# Patient Record
Sex: MALE | Race: BLACK OR AFRICAN AMERICAN | Employment: UNEMPLOYED | URBAN - METROPOLITAN AREA
[De-identification: names, ages, dates, MRNs, and addresses within clinical notes are randomized per-mention and may not be internally consistent; named-entity substitution may affect disease eponyms.]

---

## 2017-03-22 ENCOUNTER — OFFICE VISIT (OUTPATIENT)
Dept: INTERNAL MEDICINE CLINIC | Age: 57
End: 2017-03-22

## 2017-03-22 VITALS
DIASTOLIC BLOOD PRESSURE: 90 MMHG | RESPIRATION RATE: 16 BRPM | TEMPERATURE: 98.4 F | WEIGHT: 203 LBS | BODY MASS INDEX: 29.06 KG/M2 | SYSTOLIC BLOOD PRESSURE: 150 MMHG | HEIGHT: 70 IN | OXYGEN SATURATION: 97 % | HEART RATE: 74 BPM

## 2017-03-22 DIAGNOSIS — R79.89 ABNORMAL THYROID BLOOD TEST: ICD-10-CM

## 2017-03-22 DIAGNOSIS — M54.2 NECK PAIN: ICD-10-CM

## 2017-03-22 DIAGNOSIS — I10 ESSENTIAL HYPERTENSION, BENIGN: ICD-10-CM

## 2017-03-22 DIAGNOSIS — Z11.59 NEED FOR HEPATITIS C SCREENING TEST: ICD-10-CM

## 2017-03-22 DIAGNOSIS — Z12.5 PROSTATE CANCER SCREENING: ICD-10-CM

## 2017-03-22 DIAGNOSIS — Z00.00 PHYSICAL EXAM: Primary | ICD-10-CM

## 2017-03-22 RX ORDER — AMLODIPINE BESYLATE 10 MG/1
TABLET ORAL
Qty: 90 TAB | Refills: 3 | Status: SHIPPED | OUTPATIENT
Start: 2017-03-22 | End: 2018-03-23 | Stop reason: SDUPTHER

## 2017-03-22 RX ORDER — IBUPROFEN 800 MG/1
800 TABLET ORAL
Qty: 40 TAB | Refills: 1 | Status: SHIPPED | OUTPATIENT
Start: 2017-03-22 | End: 2018-03-25

## 2017-03-22 NOTE — MR AVS SNAPSHOT
Visit Information Date & Time Provider Department Dept. Phone Encounter #  
 3/22/2017  3:00 PM Analilia Gentile, [address] [facility] Internal Medicine [phone] [id number] Follow-up Instructions Return in about 1 month (around 4/22/2017). Upcoming Health Maintenance Date Due Hepatitis C Screening 1960 COLONOSCOPY 4/26/2021 DTaP/Tdap/Td series (2 - Td) 5/14/2022 Allergies as of 3/22/2017  Review Complete On: 3/22/2017 By: Raheem Champion Severity Noted Reaction Type Reactions Lactose  03/22/2017    Diarrhea Lisinopril  12/10/2009    Cough Current Immunizations  Reviewed on 4/27/2016 Name Date Hepatitis A Vaccine 1/23/1996 Hepatitis B Vaccine 5/6/1997, 4/4/1997 IPV 1/23/1996 Influenza Vaccine 11/1/2013  9:00 AM,  Incomplete Meningococcal Vaccine 1/23/1996 PPD 5/14/2012 TDAP Vaccine 5/14/2012 Zoster Vaccine, Live 4/14/2016 Not reviewed this visit You Were Diagnosed With   
  
 Codes Comments Physical exam    -  Primary ICD-10-CM: Z00.00 ICD-9-CM: V70.9 Need for hepatitis C screening test     ICD-10-CM: Z11.59 
ICD-9-CM: V73.89 Prostate cancer screening     ICD-10-CM: Z12.5 ICD-9-CM: V76.44 Abnormal thyroid blood test     ICD-10-CM: R94.6 ICD-9-CM: 794.5 Essential hypertension, benign     ICD-10-CM: I10 
ICD-9-CM: 401.1 Vitals BP Pulse Temp Resp Height(growth percentile) Weight(growth percentile) 150/90 (BP 1 Location: Right arm, BP Patient Position: Sitting) 74 98.4 °F (36.9 °C) (Oral) 16 5' 9.5\" (1.765 m) 203 lb (92.1 kg) SpO2 BMI Smoking Status 97% 29.55 kg/m2 Former Smoker Vitals History BMI and BSA Data Body Mass Index Body Surface Area  
 29.55 kg/m 2 2.13 m 2 Preferred Pharmacy Pharmacy Name Phone [id number] [id number], [address] [phone] Your Updated Medication List  
 This list is accurate as of: 3/22/17  3:48 PM.  Always use your most recent med list. amLODIPine 10 mg tablet Commonly known as:  Ambika Martin TAKE 1 TABLET BY MOUTH EVERY DAY- new dose MOTRIN PO Take  by mouth. As needed TYLENOL PO Take  by mouth. As needed Prescriptions Sent to Pharmacy Refills  
 amLODIPine (NORVASC) 10 mg tablet 3 Sig: TAKE 1 TABLET BY MOUTH EVERY DAY- new dose Class: Normal  
 Pharmacy: [id number] [place] [address], [address] [place] [address] #: [phone] We Performed the Following CBC WITH AUTOMATED DIFF [53453 CPT(R)] HEMOGLOBIN A1C WITH EAG [34997 CPT(R)] HEPATITIS C AB [20287 CPT(R)] LIPID PANEL [01123 CPT(R)] METABOLIC PANEL, COMPREHENSIVE [11302 CPT(R)] PSA SCREENING (SCREENING) [ [facility]] TSH 3RD GENERATION [65660 CPT(R)] URINALYSIS W/ RFLX MICROSCOPIC [08009 CPT(R)] VITAMIN D, 25 HYDROXY [id number] CPT(R)] Follow-up Instructions Return in about 1 month (around 4/22/2017). Introducing [facility]! Dear Juvencio Amayaer: 
Thank you for requesting a [employer] account. Our records indicate that you already have an active Mavent account. You can access your account anytime at [email or web address] [email or web address] Group Did you know that you can access your hospital and ER discharge instructions at any time in Mavent? You can also review all of your test results from your hospital stay or ER visit. Additional Information If you have questions, please visit the Frequently Asked Questions section of the Mavent website at [email or web address] [email or web address] Net 263/422 Group/. Remember, Mavent is NOT to be used for urgent needs. For medical emergencies, dial 911. Now available from your iPhone and Android! Please provide this summary of care documentation to your next provider. Your primary care clinician is listed as MARTIN GALLAGHER. If you have any questions after today's visit, please call [phone].

## 2017-03-22 NOTE — PROGRESS NOTES
HISTORY OF PRESENT ILLNESS  Daylin Kennedy is a 60 y.o. male. PARVEEN Lamb is seen today for a CPE and follow up of other concerns. Preventive medicine. Fully reviewed today. He is due for a complete physical examination and routine screening laboratory studies. He did not do his blood tests last year and is strongly encouraged to have this done this year  He is up to date with the flu vaccine, baseline EKG, other vaccinations. He is also up to date with colonoscopy. Chronic medical problems are reviewed. Hypertension is fair. Reviewed some adjustments in his regimen and will recheck in one month. Review of systems notable for some neck and back pain as well as shoulder pain. Reviewed some stretching exercises and prn Ibuprofen. He will let us know if these symptoms persist.     Social history notable for continuing to work at [employer]. His child is graduating from [employer]. [employer] [place]     We counseled regarding healthy lifestyle issues including diet, exercise and stress management. Family history, social history, etc. Are reviewed and updated, see electronic record. Review of Systems   Constitutional: Negative for weight loss. Respiratory: Negative. Cardiovascular: Negative for chest pain, palpitations, leg swelling and PND. Gastrointestinal: Negative. Genitourinary: Negative. Musculoskeletal: Positive for back pain, joint pain and neck pain. Negative for myalgias. Neurological: Negative for focal weakness. Physical Exam   Constitutional: He is oriented to person, place, and time. He appears well-developed and well-nourished. No distress. HENT:   Head: Normocephalic and atraumatic. Right Ear: Tympanic membrane, external ear and ear canal normal.   Left Ear: Tympanic membrane, external ear and ear canal normal.   Eyes: EOM are normal. Pupils are equal, round, and reactive to light. Right eye exhibits no discharge. Left eye exhibits no discharge. Neck: Normal range of motion. Neck supple. Carotid bruit is not present. No thyromegaly present. Cardiovascular: Normal rate, regular rhythm, normal heart sounds and intact distal pulses. Exam reveals no gallop and no friction rub. No murmur heard. Pulmonary/Chest: Effort normal and breath sounds normal. No respiratory distress. He has no wheezes. He has no rales. Abdominal: Soft. Bowel sounds are normal. He exhibits no distension and no mass. There is no tenderness. There is no rebound and no guarding. Genitourinary: Rectum normal. Rectal exam shows no mass and no tenderness. Prostate is enlarged. Prostate is not tender. Musculoskeletal: Normal range of motion. He exhibits no edema or tenderness. Lymphadenopathy:     He has no cervical adenopathy. Neurological: He is alert and oriented to person, place, and time. He has normal reflexes. Skin: Skin is warm and dry. No rash noted. Psychiatric: He has a normal mood and affect. His behavior is normal.   Nursing note and vitals reviewed. ASSESSMENT and PLAN  Chiquis Madera was seen today for complete physical, neck pain, back pain and shoulder pain. Diagnoses and all orders for this visit:    Physical exam  -     URINALYSIS W/ RFLX MICROSCOPIC  -     VITAMIN D, 25 HYDROXY  -     TSH 3RD GENERATION  -     METABOLIC PANEL, COMPREHENSIVE  -     CBC WITH AUTOMATED DIFF  -     HEMOGLOBIN A1C WITH EAG  -     LIPID PANEL    Need for hepatitis C screening test  -     HEPATITIS C AB    Prostate cancer screening  -     PSA SCREENING (SCREENING)    Abnormal thyroid blood test- Follow off treatment     Essential hypertension, benign  -     amLODIPine (NORVASC) 10 mg tablet; TAKE 1 TABLET BY MOUTH EVERY DAY- new dose    Neck pain  -     ibuprofen (MOTRIN) 800 mg tablet; Take 1 Tab by mouth every eight (8) hours as needed for Pain.

## 2017-03-23 LAB
25(OH)D3+25(OH)D2 SERPL-MCNC: 15.5 NG/ML (ref 30–100)
ALBUMIN SERPL-MCNC: 4.7 G/DL (ref 3.5–5.5)
ALBUMIN/GLOB SERPL: 1.7 {RATIO} (ref 1.2–2.2)
ALP SERPL-CCNC: 99 IU/L (ref 39–117)
ALT SERPL-CCNC: 15 IU/L (ref 0–44)
APPEARANCE UR: ABNORMAL
AST SERPL-CCNC: 13 IU/L (ref 0–40)
BASOPHILS # BLD AUTO: 0 X10E3/UL (ref 0–0.2)
BASOPHILS NFR BLD AUTO: 0 %
BILIRUB SERPL-MCNC: 0.6 MG/DL (ref 0–1.2)
BILIRUB UR QL STRIP: NEGATIVE
BUN SERPL-MCNC: 8 MG/DL (ref 6–24)
BUN/CREAT SERPL: 8 (ref 9–20)
CALCIUM SERPL-MCNC: 9.4 MG/DL (ref 8.7–10.2)
CHLORIDE SERPL-SCNC: 101 MMOL/L (ref 96–106)
CHOLEST SERPL-MCNC: 180 MG/DL (ref 100–199)
CO2 SERPL-SCNC: 25 MMOL/L (ref 18–29)
COLOR UR: YELLOW
CREAT SERPL-MCNC: 0.99 MG/DL (ref 0.76–1.27)
EOSINOPHIL # BLD AUTO: 0.1 X10E3/UL (ref 0–0.4)
EOSINOPHIL NFR BLD AUTO: 2 %
ERYTHROCYTE [DISTWIDTH] IN BLOOD BY AUTOMATED COUNT: 20.1 % (ref 12.3–15.4)
EST. AVERAGE GLUCOSE BLD GHB EST-MCNC: 117 MG/DL
GLOBULIN SER CALC-MCNC: 2.8 G/DL (ref 1.5–4.5)
GLUCOSE SERPL-MCNC: 93 MG/DL (ref 65–99)
GLUCOSE UR QL: NEGATIVE
HBA1C MFR BLD: 5.7 % (ref 4.8–5.6)
HCT VFR BLD AUTO: 35.9 % (ref 37.5–51)
HCV AB S/CO SERPL IA: <0.1 S/CO RATIO (ref 0–0.9)
HDLC SERPL-MCNC: 59 MG/DL
HGB BLD-MCNC: 11 G/DL (ref 12.6–17.7)
HGB UR QL STRIP: NEGATIVE
IMM GRANULOCYTES # BLD: 0 X10E3/UL (ref 0–0.1)
IMM GRANULOCYTES NFR BLD: 1 %
KETONES UR QL STRIP: NEGATIVE
LDLC SERPL CALC-MCNC: 103 MG/DL (ref 0–99)
LEUKOCYTE ESTERASE UR QL STRIP: NEGATIVE
LYMPHOCYTES # BLD AUTO: 2.3 X10E3/UL (ref 0.7–3.1)
LYMPHOCYTES NFR BLD AUTO: 32 %
MCH RBC QN AUTO: 17.7 PG (ref 26.6–33)
MCHC RBC AUTO-ENTMCNC: 30.6 G/DL (ref 31.5–35.7)
MCV RBC AUTO: 58 FL (ref 79–97)
MICRO URNS: ABNORMAL
MONOCYTES # BLD AUTO: 0.5 X10E3/UL (ref 0.1–0.9)
MONOCYTES NFR BLD AUTO: 7 %
NEUTROPHILS # BLD AUTO: 4.1 X10E3/UL (ref 1.4–7)
NEUTROPHILS NFR BLD AUTO: 58 %
NITRITE UR QL STRIP: NEGATIVE
PH UR STRIP: 7.5 [PH] (ref 5–7.5)
PLATELET # BLD AUTO: 249 X10E3/UL (ref 150–379)
POTASSIUM SERPL-SCNC: 4.1 MMOL/L (ref 3.5–5.2)
PROT SERPL-MCNC: 7.5 G/DL (ref 6–8.5)
PROT UR QL STRIP: ABNORMAL
PSA SERPL-MCNC: 3.6 NG/ML (ref 0–4)
RBC # BLD AUTO: 6.21 X10E6/UL (ref 4.14–5.8)
SODIUM SERPL-SCNC: 143 MMOL/L (ref 134–144)
SP GR UR: 1.02 (ref 1–1.03)
TRIGL SERPL-MCNC: 91 MG/DL (ref 0–149)
TSH SERPL DL<=0.005 MIU/L-ACNC: 0.78 UIU/ML (ref 0.45–4.5)
UROBILINOGEN UR STRIP-MCNC: 0.2 MG/DL (ref 0.2–1)
VLDLC SERPL CALC-MCNC: 18 MG/DL (ref 5–40)
WBC # BLD AUTO: 7 X10E3/UL (ref 3.4–10.8)

## 2017-03-29 DIAGNOSIS — R97.20 INCREASED PROSTATE SPECIFIC ANTIGEN (PSA) VELOCITY: Primary | ICD-10-CM

## 2017-03-29 DIAGNOSIS — R73.01 IFG (IMPAIRED FASTING GLUCOSE): ICD-10-CM

## 2017-03-29 RX ORDER — ERGOCALCIFEROL 1.25 MG/1
50000 CAPSULE ORAL
Qty: 12 CAP | Refills: 3 | Status: SHIPPED | OUTPATIENT
Start: 2017-03-29 | End: 2018-01-25 | Stop reason: SDUPTHER

## 2017-03-29 NOTE — PROGRESS NOTES
Advised pt MD has reviewed labs - 3 things with labs - otherwise labs look great overall.   - psa for prostate cancer screening has risen a bit more than expected, MD recommends to repeat - lab only in 3 months. Dx increased psa velocity. - there is slight elevation of average blood sugar based on the A1c measurement. This can be controlled / improved by staying active and watching the diet for concentrated sweets and excessive starchy carbohydrates.  Kim a1c in 3 months also - lab only. Will mail lab slip to pt as he requested.   -vitamin d is very low - start 55536 units q week. Stay on this as a maintenance medication. Sent to pharmacy.

## 2017-03-29 NOTE — PROGRESS NOTES
Call- 3 things with labs, otherwise Labs look great overall   - psa for prostate cancer screening has risen a bit more than expected, repeat, lab only in 3 mos. Dx increased psa velocity    - There is slight elevation of average blood sugar based on the A1c measurement. This can be controlled / improved by staying active and watching the diet for concentrated sweets and excessive starchy carbohydrates. Kim a1c in 3 mos, lab only    Vitamin d is very low- start 31767 units q week .  Stay on this as a maintenance medication

## 2017-04-19 ENCOUNTER — OFFICE VISIT (OUTPATIENT)
Dept: INTERNAL MEDICINE CLINIC | Age: 57
End: 2017-04-19

## 2017-04-19 VITALS
SYSTOLIC BLOOD PRESSURE: 130 MMHG | DIASTOLIC BLOOD PRESSURE: 80 MMHG | BODY MASS INDEX: 28.63 KG/M2 | RESPIRATION RATE: 16 BRPM | HEART RATE: 76 BPM | TEMPERATURE: 98.6 F | HEIGHT: 70 IN | WEIGHT: 200 LBS | OXYGEN SATURATION: 96 %

## 2017-04-19 DIAGNOSIS — R73.01 IFG (IMPAIRED FASTING GLUCOSE): ICD-10-CM

## 2017-04-19 DIAGNOSIS — I10 ESSENTIAL HYPERTENSION, BENIGN: Primary | ICD-10-CM

## 2017-04-19 DIAGNOSIS — E55.9 VITAMIN D DEFICIENCY: ICD-10-CM

## 2017-04-19 NOTE — MR AVS SNAPSHOT
Visit Information Date & Time Provider Department Dept. Phone Encounter #  
 4/19/2017  1:40 PM Ion Lance, [address] [facility] Internal Medicine [phone] Follow-up Instructions Return in about 1 year (around 4/19/2018) for cpe. Upcoming Health Maintenance Date Due COLONOSCOPY 4/26/2021 DTaP/Tdap/Td series (2 - Td) 5/14/2022 Allergies as of 4/19/2017  Review Complete On: 4/19/2017 By: Ion Lance MD  
  
 Severity Noted Reaction Type Reactions Lactose  03/22/2017    Diarrhea Lisinopril  12/10/2009    Cough Current Immunizations  Reviewed on 4/27/2016 Name Date Hepatitis A Vaccine 1/23/1996 Hepatitis B Vaccine 5/6/1997, 4/4/1997 IPV 1/23/1996 Influenza Vaccine 11/1/2013  9:00 AM,  Incomplete Meningococcal Vaccine 1/23/1996 PPD 5/14/2012 TDAP Vaccine 5/14/2012 Zoster Vaccine, Live 4/14/2016 Not reviewed this visit You Were Diagnosed With   
  
 Codes Comments Essential hypertension, benign    -  Primary ICD-10-CM: I10 
ICD-9-CM: 401.1 IFG (impaired fasting glucose)     ICD-10-CM: R73.01 
ICD-9-CM: 790.21 Vitamin D deficiency     ICD-10-CM: E55.9 ICD-9-CM: 268.9 Vitals BP Pulse Temp Resp Height(growth percentile) Weight(growth percentile) 130/80 (BP 1 Location: Right arm, BP Patient Position: Sitting) 76 98.6 °F (37 °C) (Oral) 16 5' 9.5\" (1.765 m) 200 lb (90.7 kg) SpO2 BMI Smoking Status 96% 29.11 kg/m2 Former Smoker BMI and BSA Data Body Mass Index Body Surface Area  
 29.11 kg/m 2 2.11 m 2 Preferred Pharmacy [facility]acy Name Phone [id number], [address] [facility] [phone] Your Updated Medication List  
  
   
This list is accurate as of: 4/19/17  2:15 PM.  Always use your most recent med list. amLODIPine 10 mg tablet Commonly known as:  Anthony Brand TAKE 1 TABLET BY MOUTH EVERY DAY- new dose  
  
 ergocalciferol 50,000 unit capsule Commonly known as:  ERGOCALCIFEROL Take 1 Cap by mouth every seven (7) days. ibuprofen 800 mg tablet Commonly known as:  MOTRIN Take 1 Tab by mouth every eight (8) hours as needed for Pain. TYLENOL PO Take  by mouth. As needed Follow-up Instructions Return in about 1 year (around 4/19/2018) for cpe. Introducing [facility]! Dear Cedric Duenas: 
Thank you for requesting a [employer] account. Our records indicate that you already have an active [employer] account. You can access your account anytime at [email or web address]. nDreams/"[email or web address] Did you know that you can access your hospital and ER discharge instructions at any time in [employer]? You can also review all of your test results from your hospital stay or ER visit. Additional Information If you have questions, please visit the Frequently Asked Questions section of the [employer] website at [email or web address] too/"[email or web address]/. Remember, [employer] is NOT to be used for urgent needs. For medical emergencies, dial 911. Now available from your iPhone and Android! Please provide this summary of care documentation to your next provider. Your primary care clinician is listed as MARTIN GALLAGHER. If you have any questions after today's visit, please call [phone].

## 2017-04-19 NOTE — PROGRESS NOTES
HISTORY OF PRESENT ILLNESS  Margy Kennedy is a 60 y.o. male. HPI Margy Gonzalez is seen today for follow up. 1. Essential hypertension. Blood pressure is improved with adjusted medication. He denies medication side effects. 2. IFG. He has changed his diet, lost some weight. He is watching carbohydrates and generally trying to eat a healthy diet. He will have labs done in two months. Office visit not required. MedDATA/gwo         Review of Systems   Constitutional: Positive for weight loss. Respiratory: Negative. Cardiovascular: Negative for chest pain, palpitations, leg swelling and PND. Musculoskeletal: Negative for myalgias. Neurological: Negative for focal weakness. Physical Exam   Constitutional: No distress. Cardiovascular: Normal rate and regular rhythm. Exam reveals no gallop and no friction rub. No murmur heard. Pulmonary/Chest: Effort normal and breath sounds normal.   Nursing note and vitals reviewed. ASSESSMENT and PLAN  Margy Gonzalez was seen today for medication evaluation.     Diagnoses and all orders for this visit:    Essential hypertension, benign- Continue current regimen of prescription and / or OTC medications     IFG (impaired fasting glucose)- Diet and exercise     Vitamin D deficiency- Continue current regimen of prescription and / or OTC medications

## 2017-10-18 LAB
EST. AVERAGE GLUCOSE BLD GHB EST-MCNC: 105 MG/DL
HBA1C MFR BLD: 5.3 % (ref 4.8–5.6)
PSA SERPL-MCNC: 4.2 NG/ML (ref 0–4)

## 2017-10-29 ENCOUNTER — TELEPHONE (OUTPATIENT)
Dept: INTERNAL MEDICINE CLINIC | Age: 57
End: 2017-10-29

## 2017-10-29 NOTE — TELEPHONE ENCOUNTER
Reviewed lab -  - a1c improved, follow  - psa increased further, See urologist as directed , names provided and he'll  schedule

## 2018-01-25 RX ORDER — ERGOCALCIFEROL 1.25 MG/1
CAPSULE ORAL
Qty: 12 CAP | Refills: 3 | Status: SHIPPED | OUTPATIENT
Start: 2018-01-25 | End: 2019-01-19 | Stop reason: SDUPTHER

## 2018-03-09 ENCOUNTER — TELEPHONE (OUTPATIENT)
Dept: INTERNAL MEDICINE CLINIC | Age: 58
End: 2018-03-09

## 2018-03-09 NOTE — TELEPHONE ENCOUNTER
Spoke with pt - he states MD referred him to urologist last year at his visit - wants to know name. Advised in chart he has referral back in 2014 to Dr Hans Morales - given number. Also wanted name for GI - referral given back in 2016 to Dr Reena Anna - number given.

## 2018-03-23 DIAGNOSIS — I10 ESSENTIAL HYPERTENSION, BENIGN: ICD-10-CM

## 2018-03-23 RX ORDER — AMLODIPINE BESYLATE 10 MG/1
TABLET ORAL
Qty: 90 TAB | Refills: 3 | Status: SHIPPED | OUTPATIENT
Start: 2018-03-23 | End: 2019-03-16 | Stop reason: SDUPTHER

## 2018-03-25 ENCOUNTER — APPOINTMENT (OUTPATIENT)
Dept: GENERAL RADIOLOGY | Age: 58
End: 2018-03-25
Attending: PHYSICIAN ASSISTANT
Payer: COMMERCIAL

## 2018-03-25 ENCOUNTER — HOSPITAL ENCOUNTER (EMERGENCY)
Age: 58
Discharge: HOME | End: 2018-03-25
Attending: EMERGENCY MEDICINE
Payer: COMMERCIAL

## 2018-03-25 VITALS
SYSTOLIC BLOOD PRESSURE: 146 MMHG | HEIGHT: 70 IN | DIASTOLIC BLOOD PRESSURE: 89 MMHG | HEART RATE: 107 BPM | WEIGHT: 202 LBS | RESPIRATION RATE: 18 BRPM | OXYGEN SATURATION: 99 % | BODY MASS INDEX: 28.92 KG/M2 | TEMPERATURE: 98.1 F

## 2018-03-25 DIAGNOSIS — S83.91XA SPRAIN OF RIGHT KNEE, UNSPECIFIED LIGAMENT, INITIAL ENCOUNTER: Primary | ICD-10-CM

## 2018-03-25 PROCEDURE — 99283 EMERGENCY DEPT VISIT LOW MDM: CPT

## 2018-03-25 PROCEDURE — 73562 X-RAY EXAM OF KNEE 3: CPT

## 2018-03-25 PROCEDURE — L1830 KO IMMOB CANVAS LONG PRE OTS: HCPCS

## 2018-03-25 RX ORDER — NAPROXEN 375 MG/1
375 TABLET ORAL 2 TIMES DAILY WITH MEALS
Qty: 20 TAB | Refills: 0 | Status: SHIPPED | OUTPATIENT
Start: 2018-03-25 | End: 2018-04-19 | Stop reason: ALTCHOICE

## 2018-03-25 NOTE — ED TRIAGE NOTES
TRIAGE NOTE: Patient reports right knee pain for a couple of days after squatting and hearing a pop. +swelling

## 2018-03-25 NOTE — DISCHARGE INSTRUCTIONS
Knee Sprain: Care Instructions  Your Care Instructions    A knee sprain is one or more stretched, partly torn, or completely torn knee ligaments. Ligaments are bands of ropelike tissue that connect bone to bone and make the knee stable. The knee has four main ligaments. Knee sprains often happen because of a twisting or bending injury from sports such as skiing, basketball, soccer, or football. The knee turns one way while the lower or upper leg goes another way. A sprain also can happen when the knee is hit from the side or the front. If a knee ligament is slightly stretched, you will probably need only home treatment. You may need a splint or brace (immobilizer) for a partly torn ligament. A complete tear may need surgery. A minor knee sprain may take up to 6 weeks to heal, while a severe sprain may take months. Follow-up care is a key part of your treatment and safety. Be sure to make and go to all appointments, and call your doctor if you are having problems. It's also a good idea to know your test results and keep a list of the medicines you take. How can you care for yourself at home? · Follow instructions about how much weight you can put on your leg and how to walk with crutches. · Prop up your leg on a pillow when you ice it or anytime you sit or lie down for the next 3 days. Try to keep it above the level of your heart. This will help reduce swelling. · Put ice or a cold pack on your knee for 10 to 20 minutes at a time. Try to do this every 1 to 2 hours for the next 3 days (when you are awake) or until the swelling goes down. Put a thin cloth between the ice and your skin. Do not get the splint wet. · If you have an elastic bandage, make sure it is snug but not so tight that your leg is numb, tingles, or swells below the bandage. You can loosen the bandage if it is too tight. · Your doctor may recommend a brace (immobilizer) to support your knee while it heals. Wear it as directed.   · Ask your doctor if you can take an over-the-counter pain medicine, such as acetaminophen (Tylenol), ibuprofen (Advil, Motrin), or naproxen (Aleve). Be safe with medicines. Read and follow all instructions on the label. When should you call for help? Call 911 anytime you think you may need emergency care. For example, call if:  ? · You have sudden chest pain and shortness of breath, or you cough up blood. ?Call your doctor now or seek immediate medical care if:  ? · You have increased or severe pain. ? · You cannot move your toes or ankle. ? · Your foot is cool or pale or changes color. ? · You have tingling, weakness, or numbness in your foot or leg. ? · Your splint or brace feels too tight. ? · You are unable to straighten the knee, or the knee \"locks. \"   ? · You have signs of a blood clot in your leg, such as:  ¨ Pain in your calf, back of the knee, thigh, or groin. ¨ Redness and swelling in your leg. ? Watch closely for changes in your health, and be sure to contact your doctor if:  ? · Your pain is not getting better or is getting worse. Where can you learn more? Go to http://megan-mitzy.info/. Enter N406 in the search box to learn more about \"Knee Sprain: Care Instructions. \"  Current as of: March 21, 2017  Content Version: 11.4  © 9321-7954 [employer]. Care instructions adapted under license by [employer] (which disclaims liability or warranty for this information). If you have questions about a medical condition or this instruction, always ask your healthcare professional. [place] [address] any warranty or liability for your use of this information. Learning About RICE (Rest, Ice, Compression, and Elevation)  What is RICE? RICE is a way to care for an injury. RICE helps relieve pain and swelling. It may also help with healing and flexibility. RICE stands for:  · Rest and protect the injured or sore area.   · Ice or a cold pack used as soon as possible. · Compression, or wrapping the injured or sore area with an elastic bandage. · Elevation (propping up) the injured or sore area. How do you do RICE? You can use RICE for home treatment when you have general aches and pains or after an injury or surgery. Rest  · Do not put weight on the injury for at least 24 to 48 hours. · Use crutches for a badly sprained knee or ankle. · Support a sprained wrist, elbow, or shoulder with a sling. Ice  · Put ice or a cold pack on the injury right away to reduce pain and swelling. Frozen vegetables will also work as an ice pack. Put a thin cloth between the ice or cold pack and your skin. The cloth protects the injured area from getting too cold. · Use ice for 10 to 15 minutes at a time for the first 48 to 72 hours. Compression  · Use compression for sprains, strains, and surgeries of the arms and legs. · Wrap the injured area with an elastic bandage or compression sleeve to reduce swelling. · Don't wrap it too tightly. If the area below it feels numb, tingles, or feels cool, loosen the wrap. Elevation  · Use elevation for areas of the body that can be propped up, such as arms and legs. · Prop up the injured area on pillows whenever you use ice. Keep it propped up anytime you sit or lie down. · Try to keep the injured area at or above the level of your heart. This will help reduce swelling and bruising. Where can you learn more? Go to http://megan-mitzy.info/. Enter I222 in the search box to learn more about \"Learning About RICE (Rest, Ice, Compression, and Elevation). \"  Current as of: March 21, 2017  Content Version: 11.4  © 4156-1938 [employer]. Care instructions adapted under license by [employer] (which disclaims liability or warranty for this information).  If you have questions about a medical condition or this instruction, always ask your healthcare professional. [employer] disclaims any warranty or liability for your use of this information.

## 2018-03-25 NOTE — ED PROVIDER NOTES
HPI Comments: Kat Kennedy is a 59 y.o. male who presents ambulatory to ER with c/o right anterior knee pain and swelling x 5 days. Pt states he squatted down to get something out of the refrigerator and felt a pop in his right knee 5 days ago. Notes pain to R knee and swelling since that time. Notes pain worse with walking and weight bearing. Has been taking tylenol and ibuprofen without any relief. PCP: Shravan Lorenzo MD   PMHx significant for: Past Medical History:  No date: Anemia NEC      Comment: chronic  No date: Hypertension  No date: Thalassemia trait    PSHx significant for: Past Surgical History:  No date: ENDOSCOPY, COLON, DIAGNOSTIC      Comment: 09,due 10  No date: HX HEENT      Comment: cyst on forehead        -- Lactose -- Diarrhea   -- Lisinopril -- Cough    There are no other complaints, changes or physical findings at this time. The history is provided by the patient. Past Medical History:   Diagnosis Date    Anemia NEC     chronic    Hypertension     Thalassemia trait        Past Surgical History:   Procedure Laterality Date    ENDOSCOPY, COLON, DIAGNOSTIC      09,due 12    HX HEENT      cyst on forehead         Family History:   Problem Relation Age of Onset    Cancer Mother      colon    Cancer Father      prostate    Cancer Brother      prostate       Social History     Social History    Marital status:      Spouse name: N/A    Number of children: N/A    Years of education: N/A     Occupational History    Not on file.      Social History Main Topics    Smoking status: Former Smoker     Packs/day: 1.00     Types: Cigarettes     Quit date: 1/18/2015    Smokeless tobacco: Never Used    Alcohol use Yes      Comment: 1 drink per week    Drug use: No    Sexual activity: Yes     Partners: Female     Birth control/ protection: None     Other Topics Concern    Not on file     Social History Narrative         ALLERGIES: Lactose and Lisinopril    Review of Systems   Constitutional: Negative. HENT: Negative. Eyes: Negative. Respiratory: Negative. Cardiovascular: Negative. Gastrointestinal: Negative. Genitourinary: Negative. Musculoskeletal: Positive for arthralgias (R knee pain). Skin: Negative. Neurological: Negative. Hematological: Negative. Psychiatric/Behavioral: Negative. All other systems reviewed and are negative. Vitals:    03/25/18 1115   BP: 146/89   Pulse: (!) 107   Resp: 18   Temp: 98.1 °F (36.7 °C)   SpO2: 99%   Weight: 91.6 kg (202 lb)   Height: 5' 9.5\" (1.765 m)            Physical Exam   Constitutional: He is oriented to person, place, and time. He appears well-developed and well-nourished. No distress. HENT:   Head: Normocephalic and atraumatic. Neck: Normal range of motion. Cardiovascular: Intact distal pulses and normal pulses. Musculoskeletal: Normal range of motion. He exhibits no edema or tenderness. Right anterior knee joint TTP along lateral aspect with mild swelling. Pain elicited with movement of joint. NVI distally,DP pulse 2+. Ambulatory with limp   Neurological: He is alert and oriented to person, place, and time. He has normal strength. No sensory deficit. Skin: Skin is warm and dry. No rash noted. He is not diaphoretic. No erythema. No pallor. Psychiatric: He has a normal mood and affect. His behavior is normal.   Nursing note and vitals reviewed. MDM  Number of Diagnoses or Management Options  Sprain of right knee, unspecified ligament, initial encounter:   Diagnosis management comments: DDx: sprain, strain, fx, dislocation       Amount and/or Complexity of Data Reviewed  Tests in the radiology section of CPT®: ordered and reviewed  Discuss the patient with other providers: yes    Patient Progress  Patient progress: stable        ED Course       Procedures                       12:24 PM  Pt has been reevaluated.  There are no new complaints, changes, or physical findings at this time. Medications have been reviewed w/ pt and/or family. Pt and/or family's questions have been answered. Pt and/or family expressed good understanding of the dx/tx/rx and is in agreement with plan of care. Pt instructed and agreed to f/u w/ PCP, ortho and to return to ED upon further deterioration. Pt is ready for discharge. LABORATORY TESTS:  No results found for this or any previous visit (from the past 12 hour(s)). IMAGING RESULTS:  XR KNEE RT 3 V   Final Result        Xr Knee Rt 3 V    Result Date: 3/25/2018  EXAM:  XR KNEE RT 3 V INDICATION:   R knee pain/swelling. COMPARISON: None. FINDINGS: Three views of the right knee demonstrate no fracture or other acute osseous or articular abnormality. There is mild tricompartmental osteoarthritis. There is a small knee joint effusion. IMPRESSION: No acute fracture or dislocation. Osteoarthritis with small knee joint effusion. MEDICATIONS GIVEN:  Medications - No data to display    IMPRESSION:  1. Sprain of right knee, unspecified ligament, initial encounter        PLAN:  1. Discharge Medication List as of 3/25/2018 12:24 PM      START taking these medications    Details   naproxen (NAPROSYN) 375 mg tablet Take 1 Tab by mouth two (2) times daily (with meals). , Print, Disp-20 Tab, R-0         CONTINUE these medications which have NOT CHANGED    Details   amLODIPine (NORVASC) 10 mg tablet TAKE 1 TABLET BY MOUTH EVERY DAY, Normal, Disp-90 Tab, R-3      ergocalciferol (ERGOCALCIFEROL) 50,000 unit capsule TAKE 1 CAPSULE BY MOUTH EVERY 7 DAYS, Normal, Disp-12 Cap, R-3      ACETAMINOPHEN (TYLENOL PO) Take  by mouth.  As needed, Historical Med         STOP taking these medications       ibuprofen (MOTRIN) 800 mg tablet Comments:   Reason for Stoppin.   Follow-up Information     Follow up With Details Comments 1 [facility] Demarcus Vergara MD Schedule an appointment as soon as possible for a visit in 3 days  [address] [place] [address] [facility] [address] [facility]      Gloria Hammer,  Schedule an appointment as soon as possible for a visit in 3 days As needed; ORTHOPEDICS 139[address] [facility] [address] [facility] [address], [facility] DEP  If symptoms worsen [address] [facility]  [phone]            Return to ED if worse

## 2018-04-19 ENCOUNTER — OFFICE VISIT (OUTPATIENT)
Dept: INTERNAL MEDICINE CLINIC | Age: 58
End: 2018-04-19

## 2018-04-19 VITALS
WEIGHT: 207.8 LBS | OXYGEN SATURATION: 95 % | RESPIRATION RATE: 18 BRPM | SYSTOLIC BLOOD PRESSURE: 116 MMHG | BODY MASS INDEX: 30.78 KG/M2 | TEMPERATURE: 98.1 F | DIASTOLIC BLOOD PRESSURE: 71 MMHG | HEIGHT: 69 IN | HEART RATE: 74 BPM

## 2018-04-19 DIAGNOSIS — Z12.5 PROSTATE CANCER SCREENING: ICD-10-CM

## 2018-04-19 DIAGNOSIS — Z00.00 PHYSICAL EXAM: Primary | ICD-10-CM

## 2018-04-19 DIAGNOSIS — Z12.11 SCREEN FOR COLON CANCER: ICD-10-CM

## 2018-04-19 DIAGNOSIS — E55.9 VITAMIN D DEFICIENCY: ICD-10-CM

## 2018-04-19 NOTE — MR AVS SNAPSHOT
[address] [place] [address] 57 
[phone] Patient: Zenon Kennedy MRN: [id number] SCT:[birth year] Visit Information Date & Time Provider Department Dept. Phone Encounter #  
 4/19/2018 10:00 AM Twan Lr, [facility] Internal Medicine [phone] [id number] Follow-up Instructions Return in about 1 year (around 4/19/2019). Upcoming Health Maintenance Date Due Influenza Age 5 to Adult 8/1/2018* COLONOSCOPY 4/26/2021 DTaP/Tdap/Td series (2 - Td) 5/14/2022 *Topic was postponed. The date shown is not the original due date. Allergies as of 4/19/2018  Review Complete On: 4/19/2018 By: Twan Lr MD  
  
 Severity Noted Reaction Type Reactions Lactose  03/22/2017    Diarrhea Lisinopril  12/10/2009    Cough Current Immunizations  Reviewed on 4/27/2016 Name Date Hepatitis A Vaccine 1/23/1996 Hepatitis B Vaccine 5/6/1997, 4/4/1997 IPV 1/23/1996 Influenza Vaccine 11/1/2013  9:00 AM,  Incomplete Meningococcal Vaccine 1/23/1996 PPD 5/14/2012 TDAP Vaccine 5/14/2012 Zoster Vaccine, Live 4/14/2016 Not reviewed this visit You Were Diagnosed With   
  
 Codes Comments Physical exam    -  Primary ICD-10-CM: Z00.00 ICD-9-CM: V70.9 Prostate cancer screening     ICD-10-CM: Z12.5 ICD-9-CM: V76.44 Vitamin D deficiency     ICD-10-CM: E55.9 ICD-9-CM: 268.9 Screen for colon cancer     ICD-10-CM: Z12.11 ICD-9-CM: V76.51 Vitals BP Pulse Temp Resp Height(growth percentile) Weight(growth percentile) 116/71 (BP 1 Location: Left arm, BP Patient Position: Sitting) 74 98.1 °F (36.7 °C) (Oral) 18 5' 9\" (1.753 m) 207 lb 12.8 oz (94.3 kg) SpO2 BMI Smoking Status 95% 30.69 kg/m2 Former Smoker BMI and BSA Data Body Mass Index Body Surface Area  
 30.69 kg/m 2 2.14 m 2 Preferred Pharmacy Pharmacy Name Phone [address] [place] [address], [facility] Esmer Boss [phone] Your Updated Medication List  
  
   
This list is accurate as of 4/19/18 10:47 AM.  Always use your most recent med list. amLODIPine 10 mg tablet Commonly known as:  Daljit Rings TAKE 1 TABLET BY MOUTH EVERY DAY  
  
 ergocalciferol 50,000 unit capsule Commonly known as:  ERGOCALCIFEROL  
TAKE 1 CAPSULE BY MOUTH EVERY 7 DAYS  
  
 TYLENOL PO Take  by mouth. As needed We Performed the Following CBC WITH AUTOMATED DIFF [51559 CPT(R)] LIPID PANEL [54785 CPT(R)] METABOLIC PANEL, COMPREHENSIVE [19821 CPT(R)] PSA SCREENING (SCREENING) [ [facility]] REFERRAL TO GASTROENTEROLOGY [PZS79 Custom] TSH 3RD GENERATION [25314 CPT(R)] URINALYSIS W/ RFLX MICROSCOPIC [01313 CPT(R)] VITAMIN D, 25 HYDROXY Q1238162 CPT(R)] Follow-up Instructions Return in about 1 year (around 4/19/2019). Referral Information Referral ID Referred By Referred To  
  
 [id number] Yony GALLAGHER MD   
   [address] [place] [address] Phone: [phone] Fax: [phone] Visits Status Start Date End Date 1 New Request 4/19/18 4/19/19 If your referral has a status of pending review or denied, additional information will be sent to support the outcome of this decision. Introducing [facility]! Dear Saul Garzon: 
Thank you for requesting a ugichem account. Our records indicate that you already have an active ugichem account. You can access your account anytime at [email or web address] Did you know that you can access your hospital and ER discharge instructions at any time in ugichem? You can also review all of your test results from your hospital stay or ER visit. Additional Information If you have questions, please visit the Frequently Asked Questions section of the [employer] website at [email or web address] Remember, Jingdong is NOT to be used for urgent needs. For medical emergencies, dial 911. Now available from your iPhone and Android! Please provide this summary of care documentation to your next provider. Your primary care clinician is listed as MARTIN GALLAGHER. If you have any questions after today's visit, please call [phone].

## 2018-04-19 NOTE — PROGRESS NOTES
HISTORY OF PRESENT ILLNESS  Pilar Kennedy is a 59 y.o. male. [facility] [place] Pilar Mix is seen today for a complete physical examination and follow up of chronic problems. 1.  Preventive medicine. Fully reviewed today. He is due for a complete physical examination and routine screening laboratory studies. Also, due for a colonoscopy. He is up to date with vaccinations and EKG. He would like to defer the new shingles vaccine. 2. Chronic medical problems are reviewed. -Blood pressure is fine. A1c due for blood sugar assessment. Review of  Systems:  Notable for right knee pain for the past month. He squatted to pick something up and felt a pop. He had several evaluations, but eventually was diagnosed with a meniscus tear which is being treated conservatively for now. Social History: Notable for him getting laid off from his job. 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 Review of Systems   Constitutional: Negative for weight loss. Respiratory: Negative. Cardiovascular: Negative for chest pain, palpitations, leg swelling and PND. Gastrointestinal: Negative. Genitourinary: Negative. Musculoskeletal: Positive for back pain and joint pain. Negative for myalgias. Back pain is mild   Neurological: Positive for dizziness. Negative for focal weakness. C/w bpv, episodic       Physical Exam   Constitutional: He is oriented to person, place, and time. He appears well-developed and well-nourished. No distress. HENT:   Head: Normocephalic and atraumatic. Right Ear: Tympanic membrane, external ear and ear canal normal.   Left Ear: Tympanic membrane, external ear and ear canal normal.   Eyes: EOM are normal. Pupils are equal, round, and reactive to light. Right eye exhibits no discharge. Left eye exhibits no discharge. Neck: Normal range of motion. Neck supple. Carotid bruit is not present. No thyromegaly present.    Cardiovascular: Normal rate, regular rhythm, normal heart sounds and intact distal pulses. Exam reveals no gallop and no friction rub. No murmur heard. Pulmonary/Chest: Effort normal and breath sounds normal. No respiratory distress. He has no wheezes. He has no rales. Abdominal: Soft. Bowel sounds are normal. He exhibits no distension and no mass. There is no tenderness. There is no rebound and no guarding. Genitourinary: Rectum normal. Rectal exam shows no mass and no tenderness. Prostate is enlarged. Prostate is not tender. Musculoskeletal: Normal range of motion. He exhibits no edema or tenderness. Lymphadenopathy:     He has no cervical adenopathy. Neurological: He is alert and oriented to person, place, and time. Reflex Scores:       Patellar reflexes are 1+ on the right side and 1+ on the left side. Skin: Skin is warm and dry. No rash noted. Psychiatric: He has a normal mood and affect. His behavior is normal.   Nursing note and vitals reviewed. ASSESSMENT and PLAN  Diagnoses and all orders for this visit:    1. Physical exam  -     LIPID PANEL  -     METABOLIC PANEL, COMPREHENSIVE  -     CBC WITH AUTOMATED DIFF  -     URINALYSIS W/ RFLX MICROSCOPIC  -     TSH 3RD GENERATION    2.  Prostate cancer screening  -     PSA SCREENING (SCREENING)    3. Vitamin D deficiency  -     VITAMIN D, 25 HYDROXY    4. Screen for colon cancer  -     REFERRAL TO GASTROENTEROLOGY

## 2018-05-24 LAB
25(OH)D3+25(OH)D2 SERPL-MCNC: 48.6 NG/ML (ref 30–100)
ALBUMIN SERPL-MCNC: 4.6 G/DL (ref 3.5–5.5)
ALBUMIN/GLOB SERPL: 1.8 {RATIO} (ref 1.2–2.2)
ALP SERPL-CCNC: 92 IU/L (ref 39–117)
ALT SERPL-CCNC: 12 IU/L (ref 0–44)
APPEARANCE UR: CLEAR
AST SERPL-CCNC: 15 IU/L (ref 0–40)
BASOPHILS # BLD AUTO: 0 X10E3/UL (ref 0–0.2)
BASOPHILS NFR BLD AUTO: 1 %
BILIRUB SERPL-MCNC: 0.2 MG/DL (ref 0–1.2)
BILIRUB UR QL STRIP: NEGATIVE
BUN SERPL-MCNC: 13 MG/DL (ref 6–24)
BUN/CREAT SERPL: 14 (ref 9–20)
CALCIUM SERPL-MCNC: 9.1 MG/DL (ref 8.7–10.2)
CHLORIDE SERPL-SCNC: 103 MMOL/L (ref 96–106)
CHOLEST SERPL-MCNC: 152 MG/DL (ref 100–199)
CO2 SERPL-SCNC: 28 MMOL/L (ref 18–29)
COLOR UR: YELLOW
CREAT SERPL-MCNC: 0.96 MG/DL (ref 0.76–1.27)
EOSINOPHIL # BLD AUTO: 0.3 X10E3/UL (ref 0–0.4)
EOSINOPHIL NFR BLD AUTO: 4 %
ERYTHROCYTE [DISTWIDTH] IN BLOOD BY AUTOMATED COUNT: 20.2 % (ref 12.3–15.4)
GFR SERPLBLD CREATININE-BSD FMLA CKD-EPI: 100 ML/MIN/1.73
GFR SERPLBLD CREATININE-BSD FMLA CKD-EPI: 86 ML/MIN/1.73
GLOBULIN SER CALC-MCNC: 2.6 G/DL (ref 1.5–4.5)
GLUCOSE SERPL-MCNC: 99 MG/DL (ref 65–99)
GLUCOSE UR QL: NEGATIVE
HCT VFR BLD AUTO: 33.9 % (ref 37.5–51)
HDLC SERPL-MCNC: 40 MG/DL
HGB BLD-MCNC: 10 G/DL (ref 13–17.7)
HGB UR QL STRIP: NEGATIVE
IMM GRANULOCYTES # BLD: 0.1 X10E3/UL (ref 0–0.1)
IMM GRANULOCYTES NFR BLD: 1 %
KETONES UR QL STRIP: NEGATIVE
LDLC SERPL CALC-MCNC: 86 MG/DL (ref 0–99)
LEUKOCYTE ESTERASE UR QL STRIP: NEGATIVE
LYMPHOCYTES # BLD AUTO: 2.4 X10E3/UL (ref 0.7–3.1)
LYMPHOCYTES NFR BLD AUTO: 28 %
MCH RBC QN AUTO: 17.6 PG (ref 26.6–33)
MCHC RBC AUTO-ENTMCNC: 29.5 G/DL (ref 31.5–35.7)
MCV RBC AUTO: 60 FL (ref 79–97)
MICRO URNS: NORMAL
MONOCYTES # BLD AUTO: 0.6 X10E3/UL (ref 0.1–0.9)
MONOCYTES NFR BLD AUTO: 7 %
NEUTROPHILS # BLD AUTO: 5.3 X10E3/UL (ref 1.4–7)
NEUTROPHILS NFR BLD AUTO: 59 %
NITRITE UR QL STRIP: NEGATIVE
PH UR STRIP: 5.5 [PH] (ref 5–7.5)
PLATELET # BLD AUTO: 266 X10E3/UL (ref 150–379)
POTASSIUM SERPL-SCNC: 4.3 MMOL/L (ref 3.5–5.2)
PROT SERPL-MCNC: 7.2 G/DL (ref 6–8.5)
PROT UR QL STRIP: NEGATIVE
PSA SERPL-MCNC: 5.4 NG/ML (ref 0–4)
RBC # BLD AUTO: 5.67 X10E6/UL (ref 4.14–5.8)
SODIUM SERPL-SCNC: 141 MMOL/L (ref 134–144)
SP GR UR: 1.03 (ref 1–1.03)
TRIGL SERPL-MCNC: 130 MG/DL (ref 0–149)
TSH SERPL DL<=0.005 MIU/L-ACNC: 1.04 UIU/ML (ref 0.45–4.5)
UROBILINOGEN UR STRIP-MCNC: 1 MG/DL (ref 0.2–1)
VLDLC SERPL CALC-MCNC: 26 MG/DL (ref 5–40)
WBC # BLD AUTO: 8.7 X10E3/UL (ref 3.4–10.8)

## 2018-05-27 NOTE — PROGRESS NOTES
Call- Labs look great / stable overall . His psa has risen further, did he go to urology as we discussed ?  Please let me know

## 2018-05-29 NOTE — PROGRESS NOTES
Advised pt his labs look great/stable overall. His psa has risen further. MD wants to know if he saw urology as discussed with MD? He states he has not. Will call to schedule with urologist and GI.  Will forward to MD.

## 2018-05-29 NOTE — PROGRESS NOTES
Advised pt MD wants him to know this is extremely important so see urologist as directed. He states he will call tomorrow. Advised him to let me call for him to schedule. He agreed.

## 2018-05-29 NOTE — PROGRESS NOTES
Advised pt he is scheduled with Dr Magalie Norman on 6/7/18 at 11 am. Pt is aware of appt location and to arrive 20 minutes earlier. Spoke with Barb Harris at [facility] to schedule this appt.

## 2018-11-16 ENCOUNTER — TELEPHONE (OUTPATIENT)
Dept: INTERNAL MEDICINE CLINIC | Age: 58
End: 2018-11-16

## 2018-11-19 NOTE — TELEPHONE ENCOUNTER
Spoke with pt - he does not need to  any form. He just wanted to know when his last physical was - 4/19/18. he also needs us to mail his lab results to him - done.

## 2019-01-20 RX ORDER — ERGOCALCIFEROL 1.25 MG/1
CAPSULE ORAL
Qty: 12 CAP | Refills: 3 | Status: SHIPPED | OUTPATIENT
Start: 2019-01-20 | End: 2020-01-22

## 2019-01-30 ENCOUNTER — OFFICE VISIT (OUTPATIENT)
Dept: INTERNAL MEDICINE CLINIC | Age: 59
End: 2019-01-30

## 2019-01-30 VITALS
WEIGHT: 202 LBS | DIASTOLIC BLOOD PRESSURE: 84 MMHG | HEIGHT: 69 IN | OXYGEN SATURATION: 98 % | TEMPERATURE: 97.9 F | RESPIRATION RATE: 18 BRPM | BODY MASS INDEX: 29.92 KG/M2 | HEART RATE: 76 BPM | SYSTOLIC BLOOD PRESSURE: 142 MMHG

## 2019-01-30 DIAGNOSIS — H81.13 BENIGN PAROXYSMAL POSITIONAL VERTIGO DUE TO BILATERAL VESTIBULAR DISORDER: ICD-10-CM
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
## 2019-01-30 NOTE — PATIENT INSTRUCTIONS
Benign Paroxysmal Positional Vertigo (BPPV): Care Instructions  Your Care Instructions    Benign paroxysmal positional vertigo, also called BPPV, is an inner ear problem. It causes a spinning or whirling sensation when you move your head. This sensation is called vertigo. The vertigo usually lasts for less than a minute. People often have vertigo spells for a few days or weeks. Then the vertigo goes away. But it may come back again. The vertigo may be mild, or it may be bad enough to cause unsteadiness, nausea, and vomiting. When you move, your inner ear sends messages to the brain. This helps you keep your balance. Vertigo can happen when debris builds up in the inner ear. The buildup can cause the inner ear to send the wrong message to the brain. Your doctor may move you in different positions to help your vertigo get better faster. This is called the Epley maneuver. Your doctor may also prescribe medicines or exercises to help with your symptoms. Follow-up care is a key part of your treatment and safety. Be sure to make and go to all appointments, and call your doctor if you are having problems. It's also a good idea to know your test results and keep a list of the medicines you take. How can you care for yourself at home? · If your doctor suggests that you do Walton-Daroff exercises:  ? Sit on the edge of a bed or sofa. Quickly lie down on the side that causes the worst vertigo. Lie on your side with your ear down. ? Stay in this position for at least 30 seconds or until the vertigo goes away. ? Sit up. If this causes vertigo, wait for it to stop. ? Repeat the procedure on the other side. ? Repeat this 10 times. Do these exercises 2 times a day until the vertigo is gone. When should you call for help? Call 911 anytime you think you may need emergency care. For example, call if:    · You have symptoms of a stroke.  These may include:  ? Sudden numbness, tingling, weakness, or loss of movement in your face, arm, or leg, especially on only one side of your body. ? Sudden vision changes. ? Sudden trouble speaking. ? Sudden confusion or trouble understanding simple statements. ? Sudden problems with walking or balance. ? A sudden, severe headache that is different from past headaches.    Call your doctor now or seek immediate medical care if:    · You have new or worse nausea and vomiting.     · You have new symptoms such as hearing loss or roaring in your ears.    Watch closely for changes in your health, and be sure to contact your doctor if:    · You are not getting better as expected.     · Your vertigo gets worse. Where can you learn more? Go to http://megan-mitzy.info/. Enter  in the search box to learn more about \"Benign Paroxysmal Positional Vertigo (BPPV): Care Instructions. \"  Current as of: March 27, 2018  Content Version: 11.9  © 4665-0034 [employer]. Care instructions adapted under license by [employer] (which disclaims liability or warranty for this information). If you have questions about a medical condition or this instruction, always ask your healthcare professional. [place] [address] any warranty or liability for your use of this information.

## 2019-01-30 NOTE — PROGRESS NOTES
HISTORY OF PRESENT ILLNESS  Mikie Kennedy is a 59 y.o. male. HPI Subjective:  Mikie Stoddard is seen today for follow up of chronic vertigo. He had a recent fall. 1. Fall. Several weeks ago he fell from the top of the stairs. Fortunately there was no significant injury. He had some bumps and bruises, but did not even require an emergency room visit. He denies syncope, simply lost his balance. 2. Chronic vertigo. He has had this episodically in the past.  Since his fall it seems a bit worse. He may have an element of posttraumatic vertigo. Will check labs to rule out any other issues, however I strongly encouraged him to have an ENT consultation regarding the chronic vertigo. He has no focal neurologic deficits or any other sign of a central process. Past Medical History:   Diagnosis Date    Anemia NEC     chronic    Hypertension     Thalassemia trait          Review of Systems   Gastrointestinal: Negative for nausea and vomiting. Musculoskeletal: Positive for falls. Neurological: Positive for dizziness and headaches. Negative for focal weakness. Endo/Heme/Allergies: Does not bruise/bleed easily. Physical Exam   Constitutional: No distress. HENT:   Head: Normocephalic and atraumatic. Neck: Carotid bruit is not present. Cardiovascular: Normal rate and regular rhythm. Exam reveals no gallop and no friction rub. No murmur heard. Pulmonary/Chest: Effort normal and breath sounds normal. No respiratory distress. Musculoskeletal: He exhibits no edema. Nursing note and vitals reviewed. ASSESSMENT and PLAN  Diagnoses and all orders for this visit:    1. Fall (on) (from) other stairs and steps, initial encounter    2.  Benign paroxysmal positional vertigo due to bilateral vestibular disorder  -     METABOLIC PANEL, COMPREHENSIVE  -     CBC WITH AUTOMATED DIFF  -     REFERRAL TO ENT-OTOLARYNGOLOGY

## 2019-02-01 LAB
ALBUMIN SERPL-MCNC: 4.6 G/DL (ref 3.6–4.8)
ALBUMIN/GLOB SERPL: 1.6 {RATIO} (ref 1.2–2.2)
ALP SERPL-CCNC: 91 IU/L (ref 39–117)
ALT SERPL-CCNC: 24 IU/L (ref 0–44)
AST SERPL-CCNC: 18 IU/L (ref 0–40)
BASOPHILS # BLD AUTO: 0 X10E3/UL (ref 0–0.2)
BASOPHILS NFR BLD AUTO: 0 %
BILIRUB SERPL-MCNC: 0.4 MG/DL (ref 0–1.2)
BUN SERPL-MCNC: 14 MG/DL (ref 8–27)
BUN/CREAT SERPL: 15 (ref 10–24)
CALCIUM SERPL-MCNC: 9.4 MG/DL (ref 8.6–10.2)
CHLORIDE SERPL-SCNC: 105 MMOL/L (ref 96–106)
CO2 SERPL-SCNC: 25 MMOL/L (ref 20–29)
CREAT SERPL-MCNC: 0.96 MG/DL (ref 0.76–1.27)
EOSINOPHIL # BLD AUTO: 0.1 X10E3/UL (ref 0–0.4)
EOSINOPHIL NFR BLD AUTO: 2 %
ERYTHROCYTE [DISTWIDTH] IN BLOOD BY AUTOMATED COUNT: 20.4 % (ref 12.3–15.4)
GLOBULIN SER CALC-MCNC: 2.9 G/DL (ref 1.5–4.5)
GLUCOSE SERPL-MCNC: 86 MG/DL (ref 65–99)
HCT VFR BLD AUTO: 36 % (ref 37.5–51)
HGB BLD-MCNC: 11.2 G/DL (ref 13–17.7)
IMM GRANULOCYTES # BLD AUTO: 0 X10E3/UL (ref 0–0.1)
IMM GRANULOCYTES NFR BLD AUTO: 0 %
LYMPHOCYTES # BLD AUTO: 2.2 X10E3/UL (ref 0.7–3.1)
LYMPHOCYTES NFR BLD AUTO: 31 %
MCH RBC QN AUTO: 18.2 PG (ref 26.6–33)
MCHC RBC AUTO-ENTMCNC: 31.1 G/DL (ref 31.5–35.7)
MCV RBC AUTO: 58 FL (ref 79–97)
MONOCYTES # BLD AUTO: 0.8 X10E3/UL (ref 0.1–0.9)
MONOCYTES NFR BLD AUTO: 11 %
NEUTROPHILS # BLD AUTO: 3.8 X10E3/UL (ref 1.4–7)
NEUTROPHILS NFR BLD AUTO: 56 %
PLATELET # BLD AUTO: 289 X10E3/UL (ref 150–379)
POTASSIUM SERPL-SCNC: 4.6 MMOL/L (ref 3.5–5.2)
PROT SERPL-MCNC: 7.5 G/DL (ref 6–8.5)
RBC # BLD AUTO: 6.16 X10E6/UL (ref 4.14–5.8)
SODIUM SERPL-SCNC: 143 MMOL/L (ref 134–144)
WBC # BLD AUTO: 6.8 X10E3/UL (ref 3.4–10.8)

## 2019-03-16 DIAGNOSIS — I10 ESSENTIAL HYPERTENSION, BENIGN: ICD-10-CM

## 2019-03-18 RX ORDER — AMLODIPINE BESYLATE 10 MG/1
TABLET ORAL
Qty: 90 TAB | Refills: 3 | Status: SHIPPED | OUTPATIENT
Start: 2019-03-18 | End: 2020-03-11

## 2019-04-09 ENCOUNTER — OFFICE VISIT (OUTPATIENT)
Dept: INTERNAL MEDICINE CLINIC | Age: 59
End: 2019-04-09

## 2019-04-09 VITALS
OXYGEN SATURATION: 98 % | HEIGHT: 69 IN | DIASTOLIC BLOOD PRESSURE: 70 MMHG | BODY MASS INDEX: 30.54 KG/M2 | RESPIRATION RATE: 18 BRPM | WEIGHT: 206.2 LBS | SYSTOLIC BLOOD PRESSURE: 150 MMHG | HEART RATE: 83 BPM | TEMPERATURE: 97.9 F

## 2019-04-09 DIAGNOSIS — K64.4 INFLAMED EXTERNAL HEMORRHOID: Primary | ICD-10-CM

## 2019-04-09 RX ORDER — HYDROCORTISONE ACETATE 25 MG/1
25 SUPPOSITORY RECTAL
Qty: 6 SUPPOSITORY | Refills: 1 | Status: SHIPPED | OUTPATIENT
Start: 2019-04-09 | End: 2019-04-23 | Stop reason: ALTCHOICE

## 2019-04-09 NOTE — PATIENT INSTRUCTIONS
Hemorrhoids: Care Instructions  Your Care Instructions    Hemorrhoids are enlarged veins that develop in the anal canal. Bleeding during bowel movements, itching, swelling, and rectal pain are the most common symptoms. They can be uncomfortable at times, but hemorrhoids rarely are a serious problem. You can treat most hemorrhoids with simple changes to your diet and bowel habits. These changes include eating more fiber and not straining to pass stools. Most hemorrhoids do not need surgery or other treatment unless they are very large and painful or bleed a lot. Follow-up care is a key part of your treatment and safety. Be sure to make and go to all appointments, and call your doctor if you are having problems. It's also a good idea to know your test results and keep a list of the medicines you take. How can you care for yourself at home? · Sit in a few inches of warm water (sitz bath) 3 times a day and after bowel movements. The warm water helps with pain and itching. · Put ice on your anal area several times a day for 10 minutes at a time. Put a thin cloth between the ice and your skin. Follow this by placing a warm, wet towel on the area for another 10 to 20 minutes. · Take pain medicines exactly as directed. ? If the doctor gave you a prescription medicine for pain, take it as prescribed. ? If you are not taking a prescription pain medicine, ask your doctor if you can take an over-the-counter medicine. · Keep the anal area clean, but be gentle. Use water and a fragrance-free soap, such as [place], or use baby wipes or medicated pads, such as Tucks. · Wear cotton underwear and loose clothing to decrease moisture in the anal area. · Eat more fiber. Include foods such as whole-grain breads and cereals, raw vegetables, raw and dried fruits, and beans. · Drink plenty of fluids, enough so that your urine is light yellow or clear like water.  If you have kidney, heart, or liver disease and have to limit fluids, talk with your doctor before you increase the amount of fluids you drink. · Use a stool softener that contains bran or psyllium. You can save money by buying bran or psyllium (available in bulk at most health food stores) and sprinkling it on foods or stirring it into fruit juice. Or you can use a product such as Metamucil or Hydrocil. · Practice healthy bowel habits. ? Go to the bathroom as soon as you have the urge. ? Avoid straining to pass stools. Relax and give yourself time to let things happen naturally. ? Do not hold your breath while passing stools. ? Do not read while sitting on the toilet. Get off the toilet as soon as you have finished. · Take your medicines exactly as prescribed. Call your doctor if you think you are having a problem with your medicine. When should you call for help? Call 911 anytime you think you may need emergency care. For example, call if:    · You pass maroon or very bloody stools.    Call your doctor now or seek immediate medical care if:    · You have increased pain.     · You have increased bleeding.    Watch closely for changes in your health, and be sure to contact your doctor if:    · Your symptoms have not improved after 3 or 4 days. Where can you learn more? Go to http://megan-mitzy.info/. Enter F228 in the search box to learn more about \"Hemorrhoids: Care Instructions. \"  Current as of: March 27, 2018  Content Version: 11.9  © 3287-0060 [employer]. Care instructions adapted under license by [employer] (which disclaims liability or warranty for this information). If you have questions about a medical condition or this instruction, always ask your healthcare professional. [place] [address] any warranty or liability for your use of this information.          Hemorrhoids: Care Instructions  Your Care Instructions    Hemorrhoids are enlarged veins that develop in the anal canal. Bleeding during bowel movements, itching, swelling, and rectal pain are the most common symptoms. They can be uncomfortable at times, but hemorrhoids rarely are a serious problem. You can treat most hemorrhoids with simple changes to your diet and bowel habits. These changes include eating more fiber and not straining to pass stools. Most hemorrhoids do not need surgery or other treatment unless they are very large and painful or bleed a lot. Follow-up care is a key part of your treatment and safety. Be sure to make and go to all appointments, and call your doctor if you are having problems. It's also a good idea to know your test results and keep a list of the medicines you take. How can you care for yourself at home? · Sit in a few inches of warm water (sitz bath) 3 times a day and after bowel movements. The warm water helps with pain and itching. · Put ice on your anal area several times a day for 10 minutes at a time. Put a thin cloth between the ice and your skin. Follow this by placing a warm, wet towel on the area for another 10 to 20 minutes. · Take pain medicines exactly as directed. ? If the doctor gave you a prescription medicine for pain, take it as prescribed. ? If you are not taking a prescription pain medicine, ask your doctor if you can take an over-the-counter medicine. · Keep the anal area clean, but be gentle. Use water and a fragrance-free soap, such as [place], or use baby wipes or medicated pads, such as Tucks. · Wear cotton underwear and loose clothing to decrease moisture in the anal area. · Eat more fiber. Include foods such as whole-grain breads and cereals, raw vegetables, raw and dried fruits, and beans. · Drink plenty of fluids, enough so that your urine is light yellow or clear like water. If you have kidney, heart, or liver disease and have to limit fluids, talk with your doctor before you increase the amount of fluids you drink. · Use a stool softener that contains bran or psyllium.  You can save money by buying bran or psyllium (available in bulk at most health food stores) and sprinkling it on foods or stirring it into fruit juice. Or you can use a product such as Metamucil or Hydrocil. · Practice healthy bowel habits. ? Go to the bathroom as soon as you have the urge. ? Avoid straining to pass stools. Relax and give yourself time to let things happen naturally. ? Do not hold your breath while passing stools. ? Do not read while sitting on the toilet. Get off the toilet as soon as you have finished. · Take your medicines exactly as prescribed. Call your doctor if you think you are having a problem with your medicine. When should you call for help? Call 911 anytime you think you may need emergency care. For example, call if:    · You pass maroon or very bloody stools.    Call your doctor now or seek immediate medical care if:    · You have increased pain.     · You have increased bleeding.    Watch closely for changes in your health, and be sure to contact your doctor if:    · Your symptoms have not improved after 3 or 4 days. Where can you learn more? Go to http://megan-mitzy.info/. Enter F228 in the search box to learn more about \"Hemorrhoids: Care Instructions. \"  Current as of: March 27, 2018  Content Version: 11.9  © 6222-8670 [employer]. Care instructions adapted under license by [employer] (which disclaims liability or warranty for this information). If you have questions about a medical condition or this instruction, always ask your healthcare professional. [place] [address] any warranty or liability for your use of this information.

## 2019-04-09 NOTE — PROGRESS NOTES
Acute Care Note    Mando Curtis is 59 y.o. male. he presents for evaluation of Hemorrhoids    The patient has a longstanding issue with hemorrhoids. She presents with discomfort at the area of her rectum. No blood, she has been somewhat constipated as well. She is concerned about this pain. Prior to Admission medications    Medication Sig Start Date End Date Taking? Authorizing Provider   amLODIPine (NORVASC) 10 mg tablet TAKE 1 TABLET BY MOUTH EVERY DAY 3/18/19  Yes Dayana Michaud MD   ergocalciferol (ERGOCALCIFEROL) 50,000 unit capsule TAKE 1 CAPSULE BY MOUTH EVERY 7 DAYS 1/20/19   Dayana Michaud MD   ACETAMINOPHEN (TYLENOL PO) Take  by mouth. As needed    Provider, Historical         Patient Active Problem List   Diagnosis Code    Essential hypertension, benign I10    Anemia, unspecified D64.9    Benign neoplasm of colon D12.6    Thalassemia trait D56.3    Depressive disorder, not elsewhere classified F32.9    Abnormal thyroid blood test R79.89    Impotence of organic origin N52.9    Positive PPD R76.11    Encounter for long-term (current) use of other medications Z79.899    Unspecified hemorrhoids without mention of complication F85.9    Elevated prostate specific antigen (PSA) R97.20    Tobacco use disorder F17.200    Other and unspecified hyperlipidemia E78.5    Elevated LDL cholesterol level E78.00    IFG (impaired fasting glucose) R73.01    Vitamin D deficiency E55.9         Review of Systems   Constitutional: Negative. Respiratory: Negative. Cardiovascular: Negative. Gastrointestinal: Negative. Visit Vitals  /70 (BP 1 Location: Right arm, BP Patient Position: Sitting)   Pulse 83   Temp 97.9 °F (36.6 °C) (Oral)   Resp 18   Ht 5' 9\" (1.753 m)   Wt 206 lb 3.2 oz (93.5 kg)   SpO2 98%   BMI 30.45 kg/m²       Physical Exam   Constitutional: No distress. ASSESSMENT/PLAN  Diagnoses and all orders for this visit:    1.  Inflamed external hemorrhoid - given history of hemorrhoid and no symptoms other than pain, will treat with Anucort. She endorses understanding. Advised the patient to call back or return to office if symptoms worsen/change/persist.   Discussed expected course/resolution/complications of diagnosis in detail with patient. Medication risks/benefits/costs/interactions/alternatives discussed with patient. The patient was given an after visit summary which includes diagnoses, current medications, & vitals. They expressed understanding with the diagnosis and plan.

## 2019-04-23 ENCOUNTER — OFFICE VISIT (OUTPATIENT)
Dept: INTERNAL MEDICINE CLINIC | Age: 59
End: 2019-04-23

## 2019-04-23 VITALS
OXYGEN SATURATION: 97 % | TEMPERATURE: 97.3 F | BODY MASS INDEX: 30.7 KG/M2 | HEART RATE: 68 BPM | DIASTOLIC BLOOD PRESSURE: 80 MMHG | RESPIRATION RATE: 18 BRPM | WEIGHT: 207.25 LBS | HEIGHT: 69 IN | SYSTOLIC BLOOD PRESSURE: 135 MMHG

## 2019-04-23 DIAGNOSIS — I10 ESSENTIAL HYPERTENSION, BENIGN: ICD-10-CM

## 2019-04-23 DIAGNOSIS — E55.9 VITAMIN D DEFICIENCY: ICD-10-CM

## 2019-04-23 DIAGNOSIS — Z23 ENCOUNTER FOR IMMUNIZATION: ICD-10-CM

## 2019-04-23 DIAGNOSIS — Z00.00 PHYSICAL EXAM: Primary | ICD-10-CM

## 2019-04-23 DIAGNOSIS — K64.4 EXTERNAL HEMORRHOID: ICD-10-CM

## 2019-04-23 DIAGNOSIS — Z12.5 PROSTATE CANCER SCREENING: ICD-10-CM

## 2019-04-23 DIAGNOSIS — Z12.11 SCREEN FOR COLON CANCER: ICD-10-CM

## 2019-04-23 NOTE — PROGRESS NOTES
HISTORY OF PRESENT ILLNESS  Dana Kennedy is a 59 y.o. male. HPI Subjective:  Dana Valadez is seen today for complete physical exam and follow up of chronic problems. 1. Preventive medicine. He is due for labs and the shingles vaccine. He is up to date with urology follow up and a TDAP booster. Colonoscopy is extremely overdue. I have strongly encouraged him to follow up with his GI specialist, however based on symptoms of hemorrhoids, colorectal surgery referral was made. 2. Chronic problems are reviewed. Hypertension is stable. Review of Systems:  Notable for hemorrhoid flare as noted above. He also has occasional mild BPV. We counseled regarding healthy lifestyle issues including diet, exercise and stress management. Family history, social history, etc. Are reviewed and updated, see electronic record. Review of Systems   Constitutional: Negative for weight loss. Respiratory: Negative. Cardiovascular: Negative for chest pain, palpitations, leg swelling and PND. Genitourinary: Positive for frequency. Musculoskeletal: Negative for myalgias. Neurological: Positive for dizziness. Negative for focal weakness. Physical Exam   Constitutional: He is oriented to person, place, and time. He appears well-developed and well-nourished. No distress. HENT:   Head: Normocephalic and atraumatic. Right Ear: Tympanic membrane, external ear and ear canal normal.   Left Ear: Tympanic membrane, external ear and ear canal normal.   Eyes: Pupils are equal, round, and reactive to light. EOM are normal. Right eye exhibits no discharge. Left eye exhibits no discharge. Neck: Normal range of motion. Neck supple. Carotid bruit is not present. No thyromegaly present. Cardiovascular: Normal rate, regular rhythm, normal heart sounds and intact distal pulses. Exam reveals no gallop and no friction rub. No murmur heard. Pulmonary/Chest: Effort normal and breath sounds normal. No respiratory distress. He has no wheezes. He has no rales. Abdominal: Soft. Bowel sounds are normal. He exhibits no distension and no mass. There is no tenderness. There is no rebound and no guarding. Musculoskeletal: Normal range of motion. He exhibits no edema or tenderness. Lymphadenopathy:     He has no cervical adenopathy. Neurological: He is alert and oriented to person, place, and time. He has normal reflexes. Skin: Skin is warm and dry. No rash noted. Psychiatric: He has a normal mood and affect. His behavior is normal.   Nursing note and vitals reviewed. ASSESSMENT and PLAN  Diagnoses and all orders for this visit:    1. Essential hypertension, benign- Continue current regimen of prescription and / or OTC medications     2. Physical exam  -     METABOLIC PANEL, COMPREHENSIVE  -     CBC WITH AUTOMATED DIFF  -     LIPID PANEL  -     TSH 3RD GENERATION  -     URINALYSIS W/ RFLX MICROSCOPIC    3. Prostate cancer screening- See urologist as directed     4. Screen for colon cancer- See surgeon as discussed/directed     5. Encounter for immunization  -     varicella-zoster recombinant, PF, (SHINGRIX) 50 mcg/0.5 mL susr injection; 0.5 mL IM once now and then repeat in 2-6 months    6.  External hemorrhoid  -     REFERRAL TO COLON AND RECTAL SURGERY    7. Vitamin D deficiency  -     VITAMIN D, 25 HYDROXY

## 2019-04-24 LAB
25(OH)D3+25(OH)D2 SERPL-MCNC: 50.9 NG/ML (ref 30–100)
ALBUMIN SERPL-MCNC: 4.7 G/DL (ref 3.6–4.8)
ALBUMIN/GLOB SERPL: 1.8 {RATIO} (ref 1.2–2.2)
ALP SERPL-CCNC: 87 IU/L (ref 39–117)
ALT SERPL-CCNC: 28 IU/L (ref 0–44)
APPEARANCE UR: CLEAR
AST SERPL-CCNC: 25 IU/L (ref 0–40)
BASOPHILS # BLD AUTO: 0 X10E3/UL (ref 0–0.2)
BASOPHILS NFR BLD AUTO: 0 %
BILIRUB SERPL-MCNC: 0.4 MG/DL (ref 0–1.2)
BILIRUB UR QL STRIP: NEGATIVE
BUN SERPL-MCNC: 15 MG/DL (ref 8–27)
BUN/CREAT SERPL: 15 (ref 10–24)
CALCIUM SERPL-MCNC: 9.7 MG/DL (ref 8.6–10.2)
CHLORIDE SERPL-SCNC: 105 MMOL/L (ref 96–106)
CHOLEST SERPL-MCNC: 197 MG/DL (ref 100–199)
CO2 SERPL-SCNC: 26 MMOL/L (ref 20–29)
COLOR UR: YELLOW
CREAT SERPL-MCNC: 1.03 MG/DL (ref 0.76–1.27)
EOSINOPHIL # BLD AUTO: 0.1 X10E3/UL (ref 0–0.4)
EOSINOPHIL NFR BLD AUTO: 2 %
ERYTHROCYTE [DISTWIDTH] IN BLOOD BY AUTOMATED COUNT: 20.2 % (ref 12.3–15.4)
GLOBULIN SER CALC-MCNC: 2.6 G/DL (ref 1.5–4.5)
GLUCOSE SERPL-MCNC: 89 MG/DL (ref 65–99)
GLUCOSE UR QL: NEGATIVE
HCT VFR BLD AUTO: 35.7 % (ref 37.5–51)
HDLC SERPL-MCNC: 52 MG/DL
HGB BLD-MCNC: 10.8 G/DL (ref 13–17.7)
HGB UR QL STRIP: NEGATIVE
IMM GRANULOCYTES # BLD AUTO: 0 X10E3/UL (ref 0–0.1)
IMM GRANULOCYTES NFR BLD AUTO: 1 %
KETONES UR QL STRIP: NEGATIVE
LDLC SERPL CALC-MCNC: 127 MG/DL (ref 0–99)
LEUKOCYTE ESTERASE UR QL STRIP: NEGATIVE
LYMPHOCYTES # BLD AUTO: 2.3 X10E3/UL (ref 0.7–3.1)
LYMPHOCYTES NFR BLD AUTO: 31 %
MCH RBC QN AUTO: 17.9 PG (ref 26.6–33)
MCHC RBC AUTO-ENTMCNC: 30.3 G/DL (ref 31.5–35.7)
MCV RBC AUTO: 59 FL (ref 79–97)
MICRO URNS: NORMAL
MONOCYTES # BLD AUTO: 0.6 X10E3/UL (ref 0.1–0.9)
MONOCYTES NFR BLD AUTO: 8 %
NEUTROPHILS # BLD AUTO: 4.3 X10E3/UL (ref 1.4–7)
NEUTROPHILS NFR BLD AUTO: 58 %
NITRITE UR QL STRIP: NEGATIVE
PH UR STRIP: 5 [PH] (ref 5–7.5)
PLATELET # BLD AUTO: 256 X10E3/UL (ref 150–379)
POTASSIUM SERPL-SCNC: 4.1 MMOL/L (ref 3.5–5.2)
PROT SERPL-MCNC: 7.3 G/DL (ref 6–8.5)
PROT UR QL STRIP: NEGATIVE
RBC # BLD AUTO: 6.04 X10E6/UL (ref 4.14–5.8)
SODIUM SERPL-SCNC: 143 MMOL/L (ref 134–144)
SP GR UR: 1.03 (ref 1–1.03)
TRIGL SERPL-MCNC: 89 MG/DL (ref 0–149)
TSH SERPL DL<=0.005 MIU/L-ACNC: 0.57 UIU/ML (ref 0.45–4.5)
UROBILINOGEN UR STRIP-MCNC: 0.2 MG/DL (ref 0.2–1)
VLDLC SERPL CALC-MCNC: 18 MG/DL (ref 5–40)
WBC # BLD AUTO: 7.4 X10E3/UL (ref 3.4–10.8)

## 2019-12-31 ENCOUNTER — OFFICE VISIT (OUTPATIENT)
Dept: URGENT CARE | Age: 59
End: 2019-12-31

## 2019-12-31 VITALS
WEIGHT: 212 LBS | DIASTOLIC BLOOD PRESSURE: 57 MMHG | HEIGHT: 69 IN | RESPIRATION RATE: 19 BRPM | SYSTOLIC BLOOD PRESSURE: 121 MMHG | HEART RATE: 74 BPM | BODY MASS INDEX: 31.4 KG/M2 | OXYGEN SATURATION: 98 % | TEMPERATURE: 98.3 F

## 2019-12-31 DIAGNOSIS — H66.92 LEFT OTITIS MEDIA, UNSPECIFIED OTITIS MEDIA TYPE: ICD-10-CM

## 2019-12-31 DIAGNOSIS — R50.9 FEVER, UNSPECIFIED FEVER CAUSE: ICD-10-CM

## 2019-12-31 LAB
FLUAV+FLUBV AG NOSE QL IA.RAPID: NEGATIVE POS/NEG
FLUAV+FLUBV AG NOSE QL IA.RAPID: NEGATIVE POS/NEG
S PYO AG THROAT QL: NEGATIVE
VALID INTERNAL CONTROL?: YES
VALID INTERNAL CONTROL?: YES

## 2019-12-31 RX ORDER — AMOXICILLIN 875 MG/1
875 TABLET, FILM COATED ORAL 2 TIMES DAILY
Qty: 14 TAB | Refills: 0 | Status: SHIPPED | OUTPATIENT
Start: 2019-12-31 | End: 2020-01-07

## 2019-12-31 NOTE — PATIENT INSTRUCTIONS
Upper Respiratory Infection (Cold): Care Instructions  Your Care Instructions    An upper respiratory infection, or URI, is an infection of the nose, sinuses, or throat. URIs are spread by coughs, sneezes, and direct contact. The common cold is the most frequent kind of URI. The flu and sinus infections are other kinds of URIs. Almost all URIs are caused by viruses. Antibiotics won't cure them. But you can treat most infections with home care. This may include drinking lots of fluids and taking over-the-counter pain medicine. You will probably feel better in 4 to 10 days. The doctor has checked you carefully, but problems can develop later. If you notice any problems or new symptoms, get medical treatment right away. Follow-up care is a key part of your treatment and safety. Be sure to make and go to all appointments, and call your doctor if you are having problems. It's also a good idea to know your test results and keep a list of the medicines you take. How can you care for yourself at home? · To prevent dehydration, drink plenty of fluids, enough so that your urine is light yellow or clear like water. Choose water and other caffeine-free clear liquids until you feel better. If you have kidney, heart, or liver disease and have to limit fluids, talk with your doctor before you increase the amount of fluids you drink. · Take an over-the-counter pain medicine, such as acetaminophen (Tylenol), ibuprofen (Advil, Motrin), or naproxen (Aleve). Read and follow all instructions on the label. · Before you use cough and cold medicines, check the label. These medicines may not be safe for young children or for people with certain health problems. · Be careful when taking over-the-counter cold or flu medicines and Tylenol at the same time. Many of these medicines have acetaminophen, which is Tylenol. Read the labels to make sure that you are not taking more than the recommended dose.  Too much acetaminophen (Tylenol) can be harmful. · Get plenty of rest.  · Do not smoke or allow others to smoke around you. If you need help quitting, talk to your doctor about stop-smoking programs and medicines. These can increase your chances of quitting for good. When should you call for help? Call 911 anytime you think you may need emergency care. For example, call if:    · You have severe trouble breathing.    Call your doctor now or seek immediate medical care if:    · You seem to be getting much sicker.     · You have new or worse trouble breathing.     · You have a new or higher fever.     · You have a new rash.    Watch closely for changes in your health, and be sure to contact your doctor if:    · You have a new symptom, such as a sore throat, an earache, or sinus pain.     · You cough more deeply or more often, especially if you notice more mucus or a change in the color of your mucus.     · You do not get better as expected. Where can you learn more? Go to http://megan-mitzy.info/. Enter O996 in the search box to learn more about \"Upper Respiratory Infection (Cold): Care Instructions. \"  Current as of: June 9, 2019  Content Version: 12.2  © 7338-5468 [employer]. Care instructions adapted under license by [employer] (which disclaims liability or warranty for this information). If you have questions about a medical condition or this instruction, always ask your healthcare professional. [place] [address] any warranty or liability for your use of this information. Ear Infection (Otitis Media): Care Instructions  Your Care Instructions    An ear infection may start with a cold and affect the middle ear (otitis media). It can hurt a lot. Most ear infections clear up on their own in a couple of days. Most often you will not need antibiotics. This is because many ear infections are caused by a virus. Antibiotics don't work against a virus.  Regular doses of pain medicines are the best way to reduce your fever and help you feel better. Follow-up care is a key part of your treatment and safety. Be sure to make and go to all appointments, and call your doctor if you are having problems. It's also a good idea to know your test results and keep a list of the medicines you take. How can you care for yourself at home? · Take pain medicines exactly as directed. ? If the doctor gave you a prescription medicine for pain, take it as prescribed. ? If you are not taking a prescription pain medicine, take an over-the-counter medicine, such as acetaminophen (Tylenol), ibuprofen (Advil, Motrin), or naproxen (Aleve). Read and follow all instructions on the label. ? Do not take two or more pain medicines at the same time unless the doctor told you to. Many pain medicines have acetaminophen, which is Tylenol. Too much acetaminophen (Tylenol) can be harmful. · Plan to take a full dose of pain reliever before bedtime. Getting enough sleep will help you get better. · Try a warm, moist washcloth on the ear. It may help relieve pain. · If your doctor prescribed antibiotics, take them as directed. Do not stop taking them just because you feel better. You need to take the full course of antibiotics. When should you call for help? Call your doctor now or seek immediate medical care if:    · You have new or increasing ear pain.     · You have new or increasing pus or blood draining from your ear.     · You have a fever with a stiff neck or a severe headache.    Watch closely for changes in your health, and be sure to contact your doctor if:    · You have new or worse symptoms.     · You are not getting better after taking an antibiotic for 2 days. Where can you learn more? Go to http://megan-mitzy.info/. Enter L961 in the search box to learn more about \"Ear Infection (Otitis Media): Care Instructions. \"  Current as of: October 21, 2018  Content Version: 12.2  © 4401-8242 [employer]. Care instructions adapted under license by [employer] (which disclaims liability or warranty for this information). If you have questions about a medical condition or this instruction, always ask your healthcare professional. [address] any warranty or liability for your use of this information.

## 2019-12-31 NOTE — PROGRESS NOTES
Cold Symptoms   The history is provided by the patient. This is a new problem. The current episode started more than 2 days ago. The problem occurs constantly. The problem has been gradually worsening. The cough is non-productive. There has been no fever. Associated symptoms include eye redness (left eye), ear congestion (left ear), rhinorrhea and sore throat. Pertinent negatives include no chest pain, no chills, no sweats, no ear pain, no headaches, no myalgias, no shortness of breath, no wheezing and no nausea. Associated symptoms comments: Cough and congestion with post nasal drainage. Watery left eye drainage. Abdirizak Fishman He has tried cough syrup for the symptoms. The treatment provided no relief.         Past Medical History:   Diagnosis Date    Anemia NEC     chronic    Hypertension     Thalassemia trait         Past Surgical History:   Procedure Laterality Date    ENDOSCOPY, COLON, DIAGNOSTIC      ,due 12    HX HEENT      cyst on forehead         Family History   Problem Relation Age of Onset    Cancer Mother         colon    Cancer Father         prostate    Cancer Brother         prostate    Liver Disease Brother 67        failure due to malaria        Social History     Socioeconomic History    Marital status:      Spouse name: Not on file    Number of children: Not on file    Years of education: Not on file    Highest education level: Not on file   Occupational History    Not on file   Social Needs    Financial resource strain: Not on file    Food insecurity:     Worry: Not on file     Inability: Not on file    Transportation needs:     Medical: Not on file     Non-medical: Not on file   Tobacco Use    Smoking status: Former Smoker     Packs/day: 1.00     Types: Cigarettes     Last attempt to quit: 2015     Years since quittin.9    Smokeless tobacco: Never Used   Substance and Sexual Activity    Alcohol use: Yes     Comment: 1 drink per week    Drug use: No    Sexual activity: Yes     Partners: Female     Birth control/protection: None   Lifestyle    Physical activity:     Days per week: Not on file     Minutes per session: Not on file    Stress: Not on file   Relationships    Social connections:     Talks on phone: Not on file     Gets together: Not on file     Attends [employer] service: Not on file     Active member of club or organization: Not on file     Attends meetings of clubs or organizations: Not on file     Relationship status: Not on file    Intimate partner violence:     Fear of current or ex partner: Not on file     Emotionally abused: Not on file     Physically abused: Not on file     Forced sexual activity: Not on file   Other Topics Concern    Not on file   Social History Narrative    Not on file                ALLERGIES: Lactose and Lisinopril    Review of Systems   Constitutional: Negative for chills, fatigue and fever. HENT: Positive for congestion, postnasal drip, rhinorrhea and sore throat. Negative for ear pain and trouble swallowing. Eyes: Positive for redness (left eye) and itching. Negative for pain and visual disturbance. Respiratory: Positive for cough. Negative for chest tightness, shortness of breath and wheezing. Cardiovascular: Negative. Negative for chest pain, palpitations and leg swelling. Gastrointestinal: Negative. Negative for nausea. Genitourinary: Negative. Musculoskeletal: Negative. Negative for myalgias. Skin: Negative. Neurological: Negative for dizziness, syncope, weakness, light-headedness, numbness and headaches. Vitals:    12/31/19 0929   BP: 121/57   Pulse: 74   Resp: 19   Temp: 98.3 °F (36.8 °C)   SpO2: 98%   Weight: 212 lb (96.2 kg)   Height: 5' 9\" (1.753 m)       Physical Exam  Constitutional:       Appearance: Normal appearance. He is well-developed.    HENT:      Right Ear: Tympanic membrane, ear canal and external ear normal.      Ears:      Comments: Left ear canal and TM erythema     Nose: Congestion and rhinorrhea present. Mouth/Throat:      Mouth: Mucous membranes are moist.      Pharynx: No oropharyngeal exudate or posterior oropharyngeal erythema. Comments: Post nasal drainage  Eyes:      Pupils: Pupils are equal, round, and reactive to light. Comments: Left conjunctivae erythema with clear watery drainage with crusting noted to eyelids. Neck:      Musculoskeletal: Normal range of motion. Cardiovascular:      Rate and Rhythm: Normal rate and regular rhythm. Heart sounds: Normal heart sounds. Pulmonary:      Effort: Pulmonary effort is normal.      Breath sounds: Normal breath sounds. Musculoskeletal: Normal range of motion. Lymphadenopathy:      Cervical: No cervical adenopathy. Skin:     General: Skin is warm and dry. Neurological:      Mental Status: He is alert and oriented to person, place, and time. MDM     Differential Diagnosis; Clinical Impression; Plan:     (P29.29) Left otitis media, unspecified otitis media type  (R50.9) Fever, unspecified fever cause  Orders Placed This Encounter      amoxicillin (AMOXIL) 875 mg tablet          Sig: Take 1 Tab by mouth two (2) times a day for 7 days. Dispense:  14 Tab          Refill:  0    Advised patient to take medication as prescribed. Use humidifier at night. Use warm compress to face or a netipot. Take a OTC decongestant Coricidin. Increase water intake. Take tylenol as needed for discomfort. Patient agreed with plan of care. The patients condition was discussed with the patient and they understand. The patient is to follow up with PCP. If signs and symptoms become worse the pt is to go to the ER. The patient is to take medications as prescribed. AVS given with patient instructions upon discharge.                   Procedures

## 2020-03-11 DIAGNOSIS — I10 ESSENTIAL HYPERTENSION, BENIGN: ICD-10-CM

## 2020-03-11 RX ORDER — AMLODIPINE BESYLATE 10 MG/1
TABLET ORAL
Qty: 30 TAB | Refills: 0 | Status: SHIPPED | OUTPATIENT
Start: 2020-03-11 | End: 2020-04-08

## 2020-04-03 DIAGNOSIS — I10 ESSENTIAL HYPERTENSION, BENIGN: ICD-10-CM

## 2020-04-07 ENCOUNTER — PATIENT MESSAGE (OUTPATIENT)
Dept: INTERNAL MEDICINE CLINIC | Age: 60
End: 2020-04-07

## 2020-04-07 NOTE — TELEPHONE ENCOUNTER
Informed patient due for follow up. Patient will need to reset password. Advise to call back to scheduled Telemed visit today.

## 2020-04-08 RX ORDER — AMLODIPINE BESYLATE 10 MG/1
TABLET ORAL
Qty: 30 TAB | Refills: 0 | Status: SHIPPED | OUTPATIENT
Start: 2020-04-08 | End: 2020-04-13 | Stop reason: SDUPTHER

## 2020-04-13 ENCOUNTER — VIRTUAL VISIT (OUTPATIENT)
Dept: INTERNAL MEDICINE CLINIC | Age: 60
End: 2020-04-13

## 2020-04-13 ENCOUNTER — DOCUMENTATION ONLY (OUTPATIENT)
Dept: INTERNAL MEDICINE CLINIC | Age: 60
End: 2020-04-13

## 2020-04-13 DIAGNOSIS — Z12.11 SCREEN FOR COLON CANCER: ICD-10-CM

## 2020-04-13 DIAGNOSIS — Z00.00 PHYSICAL EXAM: ICD-10-CM

## 2020-04-13 DIAGNOSIS — E55.9 VITAMIN D DEFICIENCY: ICD-10-CM

## 2020-04-13 DIAGNOSIS — I10 ESSENTIAL HYPERTENSION, BENIGN: Primary | ICD-10-CM

## 2020-04-13 RX ORDER — ERGOCALCIFEROL 1.25 MG/1
CAPSULE ORAL
Qty: 12 CAP | Refills: 1 | Status: SHIPPED | OUTPATIENT
Start: 2020-04-13 | End: 2020-09-30

## 2020-04-13 RX ORDER — AMLODIPINE BESYLATE 10 MG/1
TABLET ORAL
Qty: 30 TAB | Refills: 5 | Status: SHIPPED | OUTPATIENT
Start: 2020-04-13 | End: 2021-02-01

## 2020-04-13 NOTE — PROGRESS NOTES
HISTORY OF PRESENT ILLNESS  Pauly Kennedy is a 62 y.o. male. Hypertension    The history is provided by the patient. This is a chronic problem. Progression since onset: unknown, no home check but feels he should be able to. Wife is a nurse. Pertinent negatives include no chest pain, no palpitations and no PND. Risk factors include hypertension. Other   The history is provided by the patient (Vit D deficiency- lost current supply, has been off for about 2 mos. Advised restart). Pertinent negatives include no chest pain. Complete Physical   The history is provided by the patient (advised lab mid-June, stool for occult blood). Pertinent negatives include no chest pain. Review of Systems   Constitutional: Negative for chills, fever and weight loss. HENT: Positive for congestion. Respiratory: Negative. Cardiovascular: Negative for chest pain, palpitations, leg swelling and PND. Gastrointestinal: Negative. Genitourinary: Negative. Musculoskeletal: Negative for myalgias. Neurological: Negative for focal weakness. Physical Exam  Vitals signs and nursing note reviewed. Constitutional:       Appearance: He is not ill-appearing. Skin:     General: Skin is warm and dry. Neurological:      Mental Status: He is alert. Psychiatric:         Behavior: Behavior normal.         ASSESSMENT and PLAN  Diagnoses and all orders for this visit:    1. Essential hypertension, benign  -     amLODIPine (NORVASC) 10 mg tablet; TAKE 1 TABLET BY MOUTH EVERY DAY    2. Physical exam  -     METABOLIC PANEL, COMPREHENSIVE  -     CBC WITH AUTOMATED DIFF  -     LIPID PANEL  -     TSH 3RD GENERATION  -     PSA SCREENING (SCREENING)  -     URINALYSIS W/ RFLX MICROSCOPIC    3.  Vitamin D deficiency  -     ergocalciferol (ERGOCALCIFEROL) 1,250 mcg (50,000 unit) capsule; TAKE 1 CAPSULE BY MOUTH EVERY 7 DAYS    4. Screen for colon cancer  -     OCCULT BLOOD IMMUNOASSAY,DIAGNOSTIC

## 2020-09-29 DIAGNOSIS — E55.9 VITAMIN D DEFICIENCY: ICD-10-CM

## 2020-09-30 RX ORDER — ERGOCALCIFEROL 1.25 MG/1
CAPSULE ORAL
Qty: 12 CAP | Refills: 1 | Status: SHIPPED | OUTPATIENT
Start: 2020-09-30 | End: 2021-02-01

## 2020-10-05 ENCOUNTER — CLINICAL SUPPORT (OUTPATIENT)
Dept: INTERNAL MEDICINE CLINIC | Age: 60
End: 2020-10-05
Payer: COMMERCIAL

## 2020-10-05 VITALS — TEMPERATURE: 97.5 F

## 2020-10-05 DIAGNOSIS — Z23 NEEDS FLU SHOT: Primary | ICD-10-CM

## 2020-10-05 PROCEDURE — 90471 IMMUNIZATION ADMIN: CPT

## 2020-10-05 PROCEDURE — 99211 OFF/OP EST MAY X REQ PHY/QHP: CPT | Performed by: INTERNAL MEDICINE

## 2020-10-05 PROCEDURE — 90686 IIV4 VACC NO PRSV 0.5 ML IM: CPT

## 2020-10-05 NOTE — PROGRESS NOTES
Luh Kennedy  is a 56 y.o. who present for routine immunizations. Prior to vaccine administration: Consent was obtained. Risks and adverse reactions were discussed. The patient was provided the VIS and they were given an opportunity to ask questions; all questions were addressed. He  denies any symptoms, reactions or allergies that would exclude them from being immunized today. There were no adverse reactions observed post vaccination. Patient was advised to seek medical or call the office with any questions or concerns post vaccination. Patient verbalized understanding.    Manuel Bishop LPN

## 2020-10-05 NOTE — PATIENT INSTRUCTIONS
Vaccine Information Statement    Influenza (Flu) Vaccine (Inactivated or Recombinant): What You Need to Know    Many Vaccine Information Statements are available in Thai and other languages. See www.immunize.org/vis  Hojas de información sobre vacunas están disponibles en español y en muchos otros idiomas. Visite www.immunize.org/vis    1. Why get vaccinated? Influenza vaccine can prevent influenza (flu). Flu is a contagious disease that spreads around the [facility] every year, usually between October and May. Anyone can get the flu, but it is more dangerous for some people. Infants and young children, people 72years of age and older, pregnant women, and people with certain health conditions or a weakened immune system are at greatest risk of flu complications. Pneumonia, bronchitis, sinus infections and ear infections are examples of flu-related complications. If you have a medical condition, such as heart disease, cancer or diabetes, flu can make it worse. Flu can cause fever and chills, sore throat, muscle aches, fatigue, cough, headache, and runny or stuffy nose. Some people may have vomiting and diarrhea, though this is more common in children than adults. Each year thousands of people in the [facility] die from flu, and many more are hospitalized. Flu vaccine prevents millions of illnesses and flu-related visits to the doctor each year. 2. Influenza vaccines     CDC recommends everyone 10months of age and older get vaccinated every flu season. Children 6 months through 6years of age may need 2 doses during a single flu season. Everyone else needs only 1 dose each flu season. It takes about 2 weeks for protection to develop after vaccination. There are many flu viruses, and they are always changing. Each year a new flu vaccine is made to protect against three or four viruses that are likely to cause disease in the upcoming flu season.  Even when the vaccine doesnt exactly match these viruses, it may still provide some protection. Influenza vaccine does not cause flu. Influenza vaccine may be given at the same time as other vaccines. 3. Talk with your health care provider    Tell your vaccine provider if the person getting the vaccine:   Has had an allergic reaction after a previous dose of influenza vaccine, or has any severe, life-threatening allergies.  Has ever had Guillain-Barré Syndrome (also called GBS). In some cases, your health care provider may decide to postpone influenza vaccination to a future visit. People with minor illnesses, such as a cold, may be vaccinated. People who are moderately or severely ill should usually wait until they recover before getting influenza vaccine. Your health care provider can give you more information. 4. Risks of a reaction     Soreness, redness, and swelling where shot is given, fever, muscle aches, and headache can happen after influenza vaccine.  There may be a very small increased risk of Guillain-Barré Syndrome (GBS) after inactivated influenza vaccine (the flu shot). [place] children who get the flu shot along with pneumococcal vaccine (PCV13), and/or DTaP vaccine at the same time might be slightly more likely to have a seizure caused by fever. Tell your health care provider if a child who is getting flu vaccine has ever had a seizure. People sometimes faint after medical procedures, including vaccination. Tell your provider if you feel dizzy or have vision changes or ringing in the ears. As with any medicine, there is a very remote chance of a vaccine causing a severe allergic reaction, other serious injury, or death. 5. What if there is a serious problem? An allergic reaction could occur after the vaccinated person leaves the clinic.  If you see signs of a severe allergic reaction (hives, swelling of the face and throat, difficulty breathing, a fast heartbeat, dizziness, or weakness), call 9-1-1 and get the person to the nearest hospital.    For other signs that concern you, call your health care provider. Adverse reactions should be reported to the Vaccine Adverse Event Reporting System (VAERS). Your health care provider will usually file this report, or you can do it yourself. Visit the VAERS website at www.vaers. [facility].gov or call 6-333.546.4372. VAERS is only for reporting reactions, and VAERS staff do not give medical advice. 6. The National Vaccine Injury Compensation Program    The [facility] Vaccine Injury Compensation Program (VICP) is a federal program that was created to compensate people who may have been injured by certain vaccines. Visit the VICP website at www.[facility].gov/vaccinecompensation or call 5-668.723.1107 to learn about the program and about filing a claim. There is a time limit to file a claim for compensation. 7. How can I learn more?  Ask your health care provider.  Call your local or state health department.  Contact the Centers for Disease Control and Prevention (CDC):  - Call 6-389.250.1730 (3-684-EDW-INFO) or  - Visit CDCs influenza website at www.cdc.gov/flu    Vaccine Information Statement (Interim)  Inactivated Influenza Vaccine   8/15/2019  42 ANA Soto 519QP-16   Department of Health and Human Services  Centers for Disease Control and Prevention    Office Use Only

## 2020-10-10 LAB
ALBUMIN SERPL-MCNC: 4.3 G/DL (ref 3.8–4.8)
ALBUMIN/GLOB SERPL: 1.5 {RATIO} (ref 1.2–2.2)
ALP SERPL-CCNC: 120 IU/L (ref 39–117)
ALT SERPL-CCNC: 9 IU/L (ref 0–44)
APPEARANCE UR: CLEAR
AST SERPL-CCNC: 10 IU/L (ref 0–40)
BASOPHILS # BLD AUTO: 0 X10E3/UL (ref 0–0.2)
BASOPHILS NFR BLD AUTO: 0 %
BILIRUB SERPL-MCNC: 0.4 MG/DL (ref 0–1.2)
BILIRUB UR QL STRIP: NEGATIVE
BUN SERPL-MCNC: 13 MG/DL (ref 8–27)
BUN/CREAT SERPL: 11 (ref 10–24)
CALCIUM SERPL-MCNC: 9.6 MG/DL (ref 8.6–10.2)
CHLORIDE SERPL-SCNC: 103 MMOL/L (ref 96–106)
CHOLEST SERPL-MCNC: 188 MG/DL (ref 100–199)
CO2 SERPL-SCNC: 26 MMOL/L (ref 20–29)
COLOR UR: YELLOW
CREAT SERPL-MCNC: 1.19 MG/DL (ref 0.76–1.27)
EOSINOPHIL # BLD AUTO: 0.1 X10E3/UL (ref 0–0.4)
EOSINOPHIL NFR BLD AUTO: 1 %
ERYTHROCYTE [DISTWIDTH] IN BLOOD BY AUTOMATED COUNT: 20.8 % (ref 11.6–15.4)
GLOBULIN SER CALC-MCNC: 2.9 G/DL (ref 1.5–4.5)
GLUCOSE SERPL-MCNC: 87 MG/DL (ref 65–99)
GLUCOSE UR QL: NEGATIVE
HCT VFR BLD AUTO: 37.7 % (ref 37.5–51)
HDLC SERPL-MCNC: 41 MG/DL
HGB BLD-MCNC: 10.8 G/DL (ref 13–17.7)
HGB UR QL STRIP: NEGATIVE
IMM GRANULOCYTES # BLD AUTO: 0 X10E3/UL (ref 0–0.1)
IMM GRANULOCYTES NFR BLD AUTO: 0 %
KETONES UR QL STRIP: ABNORMAL
LDLC SERPL CALC-MCNC: 124 MG/DL (ref 0–99)
LEUKOCYTE ESTERASE UR QL STRIP: NEGATIVE
LYMPHOCYTES # BLD AUTO: 1.4 X10E3/UL (ref 0.7–3.1)
LYMPHOCYTES NFR BLD AUTO: 24 %
MCH RBC QN AUTO: 17.6 PG (ref 26.6–33)
MCHC RBC AUTO-ENTMCNC: 28.6 G/DL (ref 31.5–35.7)
MCV RBC AUTO: 61 FL (ref 79–97)
MICRO URNS: ABNORMAL
MONOCYTES # BLD AUTO: 0.5 X10E3/UL (ref 0.1–0.9)
MONOCYTES NFR BLD AUTO: 9 %
NEUTROPHILS # BLD AUTO: 3.7 X10E3/UL (ref 1.4–7)
NEUTROPHILS NFR BLD AUTO: 66 %
NITRITE UR QL STRIP: NEGATIVE
PH UR STRIP: 5.5 [PH] (ref 5–7.5)
PLATELET # BLD AUTO: 263 X10E3/UL (ref 150–450)
POTASSIUM SERPL-SCNC: 4.2 MMOL/L (ref 3.5–5.2)
PROT SERPL-MCNC: 7.2 G/DL (ref 6–8.5)
PROT UR QL STRIP: ABNORMAL
PSA SERPL-MCNC: 5 NG/ML (ref 0–4)
RBC # BLD AUTO: 6.14 X10E6/UL (ref 4.14–5.8)
SODIUM SERPL-SCNC: 141 MMOL/L (ref 134–144)
SP GR UR: 1.03 (ref 1–1.03)
TRIGL SERPL-MCNC: 130 MG/DL (ref 0–149)
TSH SERPL DL<=0.005 MIU/L-ACNC: 0.38 UIU/ML (ref 0.45–4.5)
UROBILINOGEN UR STRIP-MCNC: 1 MG/DL (ref 0.2–1)
VLDLC SERPL CALC-MCNC: 23 MG/DL (ref 5–40)
WBC # BLD AUTO: 5.7 X10E3/UL (ref 3.4–10.8)

## 2020-11-14 ENCOUNTER — PATIENT MESSAGE (OUTPATIENT)
Dept: INTERNAL MEDICINE CLINIC | Age: 60
End: 2020-11-14

## 2021-04-23 DIAGNOSIS — E55.9 VITAMIN D DEFICIENCY: ICD-10-CM

## 2021-04-23 RX ORDER — ERGOCALCIFEROL 1.25 MG/1
CAPSULE ORAL
Qty: 4 CAP | Refills: 0 | Status: SHIPPED | OUTPATIENT
Start: 2021-04-23 | End: 2021-06-07

## 2021-06-07 DIAGNOSIS — I10 ESSENTIAL HYPERTENSION, BENIGN: ICD-10-CM

## 2021-06-07 DIAGNOSIS — E55.9 VITAMIN D DEFICIENCY: ICD-10-CM

## 2021-06-07 RX ORDER — ERGOCALCIFEROL 1.25 MG/1
CAPSULE ORAL
Qty: 4 CAPSULE | Refills: 0 | Status: SHIPPED | OUTPATIENT
Start: 2021-06-07 | End: 2021-11-05 | Stop reason: SDUPTHER

## 2021-06-07 RX ORDER — AMLODIPINE BESYLATE 10 MG/1
TABLET ORAL
Qty: 30 TABLET | Refills: 0 | Status: SHIPPED | OUTPATIENT
Start: 2021-06-07 | End: 2021-07-01

## 2021-07-01 DIAGNOSIS — I10 ESSENTIAL HYPERTENSION, BENIGN: ICD-10-CM

## 2021-07-01 RX ORDER — AMLODIPINE BESYLATE 10 MG/1
TABLET ORAL
Qty: 30 TABLET | Refills: 2 | Status: SHIPPED | OUTPATIENT
Start: 2021-07-01 | End: 2021-10-01

## 2021-07-01 NOTE — TELEPHONE ENCOUNTER
Pt last seen 4/13/20. Due to return in one year. Forward to [place] to call to schedule appt when MD returns.

## 2021-11-03 ENCOUNTER — OFFICE VISIT (OUTPATIENT)
Dept: INTERNAL MEDICINE CLINIC | Age: 61
End: 2021-11-03
Payer: COMMERCIAL

## 2021-11-03 VITALS
RESPIRATION RATE: 16 BRPM | TEMPERATURE: 97.5 F | WEIGHT: 198 LBS | SYSTOLIC BLOOD PRESSURE: 136 MMHG | HEIGHT: 68 IN | HEART RATE: 98 BPM | DIASTOLIC BLOOD PRESSURE: 73 MMHG | OXYGEN SATURATION: 96 % | BODY MASS INDEX: 30.01 KG/M2

## 2021-11-03 DIAGNOSIS — Z00.00 PHYSICAL EXAM: Primary | ICD-10-CM

## 2021-11-03 DIAGNOSIS — E55.9 VITAMIN D DEFICIENCY: ICD-10-CM

## 2021-11-03 DIAGNOSIS — Z12.11 SCREEN FOR COLON CANCER: ICD-10-CM

## 2021-11-03 DIAGNOSIS — I10 ESSENTIAL HYPERTENSION, BENIGN: ICD-10-CM

## 2021-11-03 LAB
25(OH)D3 SERPL-MCNC: 27 NG/ML (ref 30–100)
ALBUMIN SERPL-MCNC: 3.8 G/DL (ref 3.5–5)
ALBUMIN/GLOB SERPL: 1.1 {RATIO} (ref 1.1–2.2)
ALP SERPL-CCNC: 95 U/L (ref 45–117)
ALT SERPL-CCNC: 23 U/L (ref 12–78)
ANION GAP SERPL CALC-SCNC: 1 MMOL/L (ref 5–15)
APPEARANCE UR: CLEAR
AST SERPL-CCNC: 15 U/L (ref 15–37)
BASOPHILS # BLD: 0.1 K/UL (ref 0–0.1)
BASOPHILS NFR BLD: 1 % (ref 0–1)
BILIRUB SERPL-MCNC: 0.5 MG/DL (ref 0.2–1)
BILIRUB UR QL: NEGATIVE
BUN SERPL-MCNC: 12 MG/DL (ref 6–20)
BUN/CREAT SERPL: 13 (ref 12–20)
CALCIUM SERPL-MCNC: 8.9 MG/DL (ref 8.5–10.1)
CHLORIDE SERPL-SCNC: 108 MMOL/L (ref 97–108)
CHOLEST SERPL-MCNC: 189 MG/DL
CO2 SERPL-SCNC: 29 MMOL/L (ref 21–32)
COLOR UR: NORMAL
CREAT SERPL-MCNC: 0.96 MG/DL (ref 0.7–1.3)
DIFFERENTIAL METHOD BLD: ABNORMAL
EOSINOPHIL # BLD: 0.2 K/UL (ref 0–0.4)
EOSINOPHIL NFR BLD: 3 % (ref 0–7)
ERYTHROCYTE [DISTWIDTH] IN BLOOD BY AUTOMATED COUNT: 18.9 % (ref 11.5–14.5)
EST. AVERAGE GLUCOSE BLD GHB EST-MCNC: 114 MG/DL
GLOBULIN SER CALC-MCNC: 3.6 G/DL (ref 2–4)
GLUCOSE SERPL-MCNC: 85 MG/DL (ref 65–100)
GLUCOSE UR STRIP.AUTO-MCNC: NEGATIVE MG/DL
HBA1C MFR BLD: 5.6 % (ref 4–5.6)
HCT VFR BLD AUTO: 34.6 % (ref 36.6–50.3)
HDLC SERPL-MCNC: 54 MG/DL
HDLC SERPL: 3.5 {RATIO} (ref 0–5)
HGB BLD-MCNC: 10.1 G/DL (ref 12.1–17)
HGB UR QL STRIP: NEGATIVE
IMM GRANULOCYTES # BLD AUTO: 0 K/UL (ref 0–0.04)
IMM GRANULOCYTES NFR BLD AUTO: 0 % (ref 0–0.5)
KETONES UR QL STRIP.AUTO: NEGATIVE MG/DL
LDLC SERPL CALC-MCNC: 122.6 MG/DL (ref 0–100)
LEUKOCYTE ESTERASE UR QL STRIP.AUTO: NEGATIVE
LYMPHOCYTES # BLD: 1.6 K/UL (ref 0.8–3.5)
LYMPHOCYTES NFR BLD: 29 % (ref 12–49)
MCH RBC QN AUTO: 17.4 PG (ref 26–34)
MCHC RBC AUTO-ENTMCNC: 29.2 G/DL (ref 30–36.5)
MCV RBC AUTO: 59.8 FL (ref 80–99)
MONOCYTES # BLD: 0.6 K/UL (ref 0–1)
MONOCYTES NFR BLD: 11 % (ref 5–13)
NEUTS SEG # BLD: 3 K/UL (ref 1.8–8)
NEUTS SEG NFR BLD: 56 % (ref 32–75)
NITRITE UR QL STRIP.AUTO: NEGATIVE
NRBC # BLD: 0 K/UL (ref 0–0.01)
NRBC BLD-RTO: 0 PER 100 WBC
PH UR STRIP: 6 [PH] (ref 5–8)
PLATELET # BLD AUTO: 248 K/UL (ref 150–400)
POTASSIUM SERPL-SCNC: 3.5 MMOL/L (ref 3.5–5.1)
PROT SERPL-MCNC: 7.4 G/DL (ref 6.4–8.2)
PROT UR STRIP-MCNC: NEGATIVE MG/DL
PSA SERPL-MCNC: 9 NG/ML (ref 0.01–4)
RBC # BLD AUTO: 5.79 M/UL (ref 4.1–5.7)
RBC MORPH BLD: ABNORMAL
SODIUM SERPL-SCNC: 138 MMOL/L (ref 136–145)
SP GR UR REFRACTOMETRY: 1.02 (ref 1–1.03)
TRIGL SERPL-MCNC: 62 MG/DL (ref ?–150)
TSH SERPL DL<=0.05 MIU/L-ACNC: 0.95 UIU/ML (ref 0.36–3.74)
UROBILINOGEN UR QL STRIP.AUTO: 1 EU/DL (ref 0.2–1)
VLDLC SERPL CALC-MCNC: 12.4 MG/DL
WBC # BLD AUTO: 5.5 K/UL (ref 4.1–11.1)

## 2021-11-03 PROCEDURE — 99396 PREV VISIT EST AGE 40-64: CPT | Performed by: INTERNAL MEDICINE

## 2021-11-03 RX ORDER — LORATADINE 10 MG/1
10 TABLET ORAL DAILY
Qty: 10 TABLET | Refills: 0
Start: 2021-11-03 | End: 2021-11-13

## 2021-11-03 RX ORDER — PREDNISONE 10 MG/1
TABLET ORAL
Qty: 30 TABLET | Refills: 0 | Status: SHIPPED | OUTPATIENT
Start: 2021-11-03 | End: 2021-11-15

## 2021-11-03 NOTE — PROGRESS NOTES
Verified name and birth date for privacy precautions. Chart reviewed in preparation for today's visit. Chief Complaint   Patient presents with    Complete Physical          Health Maintenance Due   Topic    Shingrix Vaccine Age 50> (1 of 2)    A1C test (Diabetic or Prediabetic)     Colorectal Cancer Screening Combo     Flu Vaccine (1)         Wt Readings from Last 3 Encounters:   11/03/21 198 lb (89.8 kg)   12/31/19 212 lb (96.2 kg)   04/23/19 207 lb 4 oz (94 kg)     Temp Readings from Last 3 Encounters:   11/03/21 97.5 °F (36.4 °C) (Temporal)   10/05/20 97.5 °F (36.4 °C) (Temporal)   12/31/19 98.3 °F (36.8 °C)     BP Readings from Last 3 Encounters:   11/03/21 136/73   12/31/19 121/57   04/23/19 135/80     Pulse Readings from Last 3 Encounters:   11/03/21 98   12/31/19 74   04/23/19 68         Learning Assessment:  :     Learning Assessment 4/19/2018 2/18/2015 11/11/2014   PRIMARY LEARNER Patient Patient Patient   PRIMARY LANGUAGE ENGLISH ENGLISH ENGLISH   LEARNER PREFERENCE PRIMARY OTHER (COMMENT) LISTENING LISTENING   ANSWERED BY patient self self   RELATIONSHIP SELF SELF SELF       Depression Screening:  :     3 most recent PHQ Screens 11/3/2021   Little interest or pleasure in doing things Not at all   Feeling down, depressed, irritable, or hopeless Not at all   Total Score PHQ 2 0       Fall Risk Assessment:  :     No flowsheet data found. Abuse Screening:  :     Abuse Screening Questionnaire 11/3/2021 1/27/2016   Do you ever feel afraid of your partner? N N   Are you in a relationship with someone who physically or mentally threatens you? N N   Is it safe for you to go home?  Shirlene Arguello

## 2021-11-03 NOTE — PROGRESS NOTES
HISTORY OF PRESENT ILLNESS  Pauly Kennedy is a 59 y.o. male. HPI  Assessment: Pauly Suazo is seen today for a complete exam and overdue routine follow up. Preventive Care. He is due for labs, shingles vaccine, colonoscopy and flu vaccine. He is up to date with COVID vaccination and Tdap booster. Chronic Problems Reviewed. Blood pressure is stable. He has been off vitamin D for three months. We will check levels and restart as is appropriate. Social History. Notable for him currently working at the [employer]. We counseled regarding healthy lifestyle issues including diet, exercise and stress management. Family history, social history, etc. Are reviewed and updated, see electronic record. Review of Systems   Constitutional: Negative for chills, fever and weight loss. HENT: Positive for ear pain and sore throat. ? Allergy. 3 days   Respiratory: Negative. Cardiovascular: Negative for chest pain, palpitations, leg swelling and PND. Gastrointestinal: Negative. Negative for blood in stool and melena. Genitourinary: Negative. Musculoskeletal: Positive for back pain. Negative for myalgias. Right flank    Left foot arch pain- saw podiatry   Neurological: Negative for focal weakness. Physical Exam  Vitals and nursing note reviewed. Constitutional:       General: He is not in acute distress. Appearance: He is well-developed. HENT:      Head: Normocephalic and atraumatic. Right Ear: Tympanic membrane, ear canal and external ear normal.      Left Ear: Tympanic membrane, ear canal and external ear normal.      Mouth/Throat:      Pharynx: No oropharyngeal exudate or posterior oropharyngeal erythema. Eyes:      General:         Right eye: No discharge. Left eye: No discharge. Pupils: Pupils are equal, round, and reactive to light. Neck:      Thyroid: No thyromegaly. Vascular: No carotid bruit.    Cardiovascular:      Rate and Rhythm: Normal rate and regular rhythm. Heart sounds: Normal heart sounds. No murmur heard. No friction rub. No gallop. Pulmonary:      Effort: Pulmonary effort is normal. No respiratory distress. Breath sounds: Normal breath sounds. No wheezing or rales. Abdominal:      General: Bowel sounds are normal. There is no distension. Palpations: Abdomen is soft. There is no mass. Tenderness: There is no abdominal tenderness. There is no guarding or rebound. Musculoskeletal:         General: No tenderness. Normal range of motion. Cervical back: Normal range of motion and neck supple. Lymphadenopathy:      Cervical: No cervical adenopathy. Skin:     General: Skin is warm and dry. Findings: No rash. Neurological:      Mental Status: He is alert and oriented to person, place, and time. Deep Tendon Reflexes: Reflexes are normal and symmetric. Psychiatric:         Behavior: Behavior normal.         ASSESSMENT and PLAN  Diagnoses and all orders for this visit:    1. Physical exam  -     METABOLIC PANEL, COMPREHENSIVE; Future  -     CBC WITH AUTOMATED DIFF; Future  -     LIPID PANEL; Future  -     HEMOGLOBIN A1C WITH EAG; Future  -     TSH 3RD GENERATION; Future  -     PSA SCREENING (SCREENING); Future  -     URINALYSIS W/ RFLX MICROSCOPIC; Future    2. Essential hypertension, benign    3. Vitamin D deficiency  -     VITAMIN D, 25 HYDROXY; Future  -     ergocalciferol (ERGOCALCIFEROL) 1,250 mcg (50,000 unit) capsule; Take 1 Capsule by mouth every seven (7) days. 4. Screen for colon cancer  -     REFERRAL TO GASTROENTEROLOGY    Other orders  -     loratadine (Claritin) 10 mg tablet; Take 1 Tablet by mouth daily for 10 days.   -     predniSONE (DELTASONE) 10 mg tablet; 4 tabs x 3 days, 3 tabs x 3 days, 2 tabs x 3 days, 1 tab x 3 days

## 2021-11-05 RX ORDER — ERGOCALCIFEROL 1.25 MG/1
50000 CAPSULE ORAL
Qty: 4 CAPSULE | Refills: 5 | Status: SHIPPED | OUTPATIENT
Start: 2021-11-05 | End: 2022-05-03

## 2021-11-05 NOTE — PROGRESS NOTES
Call- Labs look great overall except for the following  1. Vitamin d is low, restart weekly vitamin D. I have sent this in  2. Psa for prostate cancer screening has jumped up. See urologist as directed, give names and number for Dr Johan Mcdaniel and Dr Lady Pinto. Tell him very important to get this checked.  Or if he really saw Dr Jeanne Bojorquez in 2018 , ok to follow up with him

## 2021-11-08 DIAGNOSIS — R97.20 ELEVATED PSA: Primary | ICD-10-CM

## 2021-11-08 NOTE — PROGRESS NOTES
Pt not available - he was at work. Spoke with pt's wife Yumiko Funk (on HIPAA). Advised her pt's labs look great overall except for the followin. Vitamin d is low - he needs to restart weekly vitamin D. MD has sent this into pharmacy. She plans to  this evening. 2. Pt's PSA for prostate cancer screening has jumped up. Needs to see urologist as directed, given names and number for Dr Fabricio Goode and Dr Kwon Signs. Advised her to let pt know this is very important to get this checked. Or if he really saw Dr Alaina Presley in 2018 , ok to follow up with him. She states she wants him to see Dr Terra West. She will call for appt tomorrow once she talks to pt. Faxed pt's referral and pt's several years of PSA results to Dr Terra West office [phone]. Confirmation received.  Will forward to MD.

## 2022-03-18 PROBLEM — R73.01 IFG (IMPAIRED FASTING GLUCOSE): Status: ACTIVE | Noted: 2017-04-19

## 2022-03-19 PROBLEM — E55.9 VITAMIN D DEFICIENCY: Status: ACTIVE | Noted: 2017-04-19

## 2022-05-03 DIAGNOSIS — E55.9 VITAMIN D DEFICIENCY: ICD-10-CM

## 2022-05-03 RX ORDER — ERGOCALCIFEROL 1.25 MG/1
50000 CAPSULE ORAL
Qty: 4 CAPSULE | Refills: 5 | Status: SHIPPED | OUTPATIENT
Start: 2022-05-03 | End: 2022-11-01 | Stop reason: ALTCHOICE

## 2022-05-20 ENCOUNTER — HOSPITAL ENCOUNTER (EMERGENCY)
Age: 62
Discharge: HOME | End: 2022-05-20
Attending: STUDENT IN AN ORGANIZED HEALTH CARE EDUCATION/TRAINING PROGRAM
Payer: COMMERCIAL

## 2022-05-20 VITALS
OXYGEN SATURATION: 98 % | TEMPERATURE: 98.1 F | SYSTOLIC BLOOD PRESSURE: 139 MMHG | BODY MASS INDEX: 28.49 KG/M2 | WEIGHT: 199 LBS | DIASTOLIC BLOOD PRESSURE: 73 MMHG | HEIGHT: 70 IN | HEART RATE: 72 BPM | RESPIRATION RATE: 16 BRPM

## 2022-05-20 DIAGNOSIS — H11.31 SUBCONJUNCTIVAL HEMORRHAGE OF RIGHT EYE: ICD-10-CM

## 2022-05-20 DIAGNOSIS — H57.89 EYE REDNESS: Primary | ICD-10-CM

## 2022-05-20 PROCEDURE — 74011000250 HC RX REV CODE- 250: Performed by: PHYSICIAN ASSISTANT

## 2022-05-20 PROCEDURE — 99282 EMERGENCY DEPT VISIT SF MDM: CPT

## 2022-05-20 RX ORDER — TETRACAINE HYDROCHLORIDE 5 MG/ML
1 SOLUTION OPHTHALMIC
Status: COMPLETED | OUTPATIENT
Start: 2022-05-20 | End: 2022-05-20

## 2022-05-20 RX ADMIN — FLUORESCEIN SODIUM 1 STRIP: 1 STRIP OPHTHALMIC at 10:09

## 2022-05-20 RX ADMIN — TETRACAINE HYDROCHLORIDE 1 DROP: 5 SOLUTION OPHTHALMIC at 09:32

## 2022-05-20 NOTE — ED PROVIDER NOTES
Arline Osman is a 59 y.o. male with PMhx of HTN, chronic anemia, thalassemia trait, who presents to ED with cc of R eye erythema x last night. States it was a little painful last night and notes today it was itching after his shower. Denies visual changes or discharge. Pt denies additional symptoms of F/C, cough, congestion. Pt states he is no amlodipine, intermittent meloxicam, Vitamin B 12 once per week. Denies use of blood thinners. Patient endorses use of glasses without use of contacts. PMHx: Reviewed, as below. PSHx: Reviewed, as below. PCP: Rena Stanford MD    There are no other complaints verbalized at this time.                Past Medical History:   Diagnosis Date    Anemia NEC     chronic    Hypertension     Thalassemia trait        Past Surgical History:   Procedure Laterality Date    ENDOSCOPY, COLON, DIAGNOSTIC      ,due 12    HX HEENT      cyst on forehead         Family History:   Problem Relation Age of Onset    Cancer Mother         colon    Cancer Father         prostate    Cancer Brother         prostate    Liver Disease Brother 67        failure due to malaria       Social History     Socioeconomic History    Marital status:      Spouse name: Not on file    Number of children: Not on file    Years of education: Not on file    Highest education level: Not on file   Occupational History    Not on file   Tobacco Use    Smoking status: Former Smoker     Packs/day: 1.00     Years: 10.00     Pack years: 10.00     Types: Cigarettes     Quit date: 2015     Years since quittin.3    Smokeless tobacco: Never Used   Substance and Sexual Activity    Alcohol use: Yes     Comment: 1 drink per week    Drug use: No    Sexual activity: Yes     Partners: Female     Birth control/protection: None   Other Topics Concern    Not on file   Social History Narrative    Not on file     Social Determinants of Health     Financial Resource Strain:    [facility] Difficulty of Paying Living Expenses: Not on file   Food Insecurity:     Worried About Running Out of Food in the Last Year: Not on file    Ran Out of Food in the Last Year: Not on file   Transportation Needs:     Lack of Transportation (Medical): Not on file    Lack of Transportation (Non-Medical): Not on file   Physical Activity:     Days of Exercise per Week: Not on file    Minutes of Exercise per Session: Not on file   Stress:     Feeling of Stress : Not on file   Social Connections:     Frequency of Communication with Friends and Family: Not on file    Frequency of Social Gatherings with Friends and Family: Not on file    Attends Mu-ism Services: Not on file    Active Member of [address] [place] [address] [employer] or Organizations: Not on file    Attends Club or Organization Meetings: Not on file    Marital Status: Not on file   Intimate Partner Violence:     Fear of Current or Ex-Partner: Not on file    Emotionally Abused: Not on file    Physically Abused: Not on file    Sexually Abused: Not on file   Housing Stability:     Unable to Pay for Housing in the Last Year: Not on file    Number of [place] in the Last Year: Not on file    Unstable Housing in the Last Year: Not on file         ALLERGIES: Lactose and Lisinopril    Review of Systems   Constitutional: Negative for chills and fever. HENT: Negative for congestion. Eyes: Positive for redness and itching. Negative for discharge and visual disturbance. Respiratory: Negative for cough. All other systems reviewed and are negative. Vitals:    05/20/22 0915   BP: 139/73   Pulse: 72   Resp: 16   Temp: 98.1 °F (36.7 °C)   SpO2: 98%   Weight: 90.3 kg (199 lb)   Height: 5' 9.5\" (1.765 m)            Physical Exam  Vitals and nursing note reviewed. Constitutional:       Appearance: He is not toxic-appearing or diaphoretic. HENT:      Head: Normocephalic.       Right Ear: External ear normal.      Left Ear: External ear normal.   Eyes:      General: Lids are normal.         Right eye: No discharge. Left eye: No discharge. Extraocular Movements:      Right eye: Normal extraocular motion and no nystagmus. Left eye: Normal extraocular motion and no nystagmus. Conjunctiva/sclera:      Right eye: Hemorrhage present. Left eye: No hemorrhage. Pupils: Pupils are equal, round, and reactive to light. Comments: No hyphema. Cardiovascular:      Rate and Rhythm: Normal rate. Pulmonary:      Effort: Pulmonary effort is normal. No respiratory distress. Abdominal:      General: There is no distension. Musculoskeletal:         General: No swelling or deformity. Cervical back: Normal range of motion. Skin:     General: Skin is warm and dry. Neurological:      Mental Status: He is alert and oriented to person, place, and time. Mental status is at baseline. MDM  Number of Diagnoses or Management Options     Amount and/or Complexity of Data Reviewed  Discuss the patient with other providers: yes (Dr Maximo Fernández, ED attending)           Procedures          Procedure Note - Wood's lamp exam:  10:20 AM  Performed by: Dat Tanner PA-C  Pts R eye was anesthetized with tetracaine, stained with fluorescein, and examined with a Wood's lamp, using lid eversion. Foreign body: no  Fluorescein uptake: no, showing no evidence of abrasion or ulceration or lesion  The procedure took 1-15 minutes, and pt tolerated well. Procedure Note - Carlos Eduardo-pen Exam:  10:23 AM   Performed by Dat Tanner PA-C:  Pt's intraocular pressure in his R eye was checked using a carlos eduardo-pen. Prior to procedure carlos eduardo-pen was calibrated and reset for accurate measurement. Pressure was 11.5 and 11.8 mmHg in his R eye. The procedure took 1-15 minutes, and pt tolerated well.              VITAL SIGNS:  Vitals:    05/20/22 0915   BP: 139/73   Pulse: 72   Resp: 16   Temp: 98.1 °F (36.7 °C)   SpO2: 98%   Weight: 90.3 kg (199 lb)   Height: 5' 9.5\" (1.765 m) LABS:  No results found for this or any previous visit (from the past 24 hour(s)). IMAGING:  No orders to display         Medications During Visit:  Medications   tetracaine HCl (PF) (PONTOCAINE) 0.5 % ophthalmic solution 1 Drop (1 Drop Right Eye Given by Provider 5/20/22 3337)   fluorescein (FUL-MARISSA) 1 mg ophthalmic strip 1 Strip (1 Strip Both Eyes Given by Provider 5/20/22 4262)         DECISION MAKING:    Saeid Kennedy is a 59 y.o. male who comes in as above. Presents with presentation consistent with subconjunctival hemorrhage. Visual acuity even across his eyes. Carlos Eduardo-Pen without elevation indicative of increased IOP and fluorescein stain without evidence of ulceration, abrasion, lesion or other abnormality here. I have discussed with patient follow-up with his ophthalmologist.  He endorses use of glasses without use of contacts. I have discussed care with ED attending. Pt has been given close return precautions as well as follow up recommendations. Opportunity for questions presented. Pt verbalizes their understanding and agreement with care plan. IMPRESSION:  1. Eye redness    2. Subconjunctival hemorrhage of right eye        DISPOSITION:  Discharged      Discharge Medication List as of 5/20/2022 10:25 AM           Follow-up Information     Follow up With Specialties Details Why Contact Info    [address] DEP Emergency Medicine Go to  As needed, If symptoms worsen [address] [facility]  [phone]    Please follow-up with the ophthalmologist who prescribes your eyeglasses  Schedule an appointment as soon as possible for a visit               The patient is asked to follow-up with their primary care provider and any other physicians as above. We have discussed strict return precautions and the patient is asked to return promptly for any increased concerns or worsening of symptoms and for return precautions regarding their symptoms today.   They can return to this emergency department or any other emergency department. I have discussed with them results as above and presented opportunity for questions. They verbalize their understanding of the above and agreement with care plan. Please note that this dictation was completed with [employer], the computer voice recognition software. Quite often unanticipated grammatical, syntax, homophones, and other interpretive errors are inadvertently transcribed by the computer software. Please disregard these errors. Please excuse any errors that have escaped final proofreading.

## 2022-06-02 NOTE — ED NOTES
PA reviewed discharge instructions with patient. Patient verbalized understanding. Time allotted for questions. A&O at time of discharge. VSS. Patient ambulatory off unit. Gino Watts(Attending)

## 2022-06-07 DIAGNOSIS — I10 ESSENTIAL HYPERTENSION, BENIGN: ICD-10-CM

## 2022-06-07 RX ORDER — AMLODIPINE BESYLATE 10 MG/1
TABLET ORAL
Qty: 30 TABLET | Refills: 5 | Status: SHIPPED | OUTPATIENT
Start: 2022-06-07

## 2022-10-03 ENCOUNTER — OFFICE VISIT (OUTPATIENT)
Dept: INTERNAL MEDICINE CLINIC | Age: 62
End: 2022-10-03
Payer: COMMERCIAL

## 2022-10-03 VITALS
RESPIRATION RATE: 16 BRPM | TEMPERATURE: 98.4 F | SYSTOLIC BLOOD PRESSURE: 138 MMHG | BODY MASS INDEX: 28.77 KG/M2 | OXYGEN SATURATION: 95 % | HEART RATE: 70 BPM | HEIGHT: 70 IN | DIASTOLIC BLOOD PRESSURE: 73 MMHG | WEIGHT: 201 LBS

## 2022-10-03 DIAGNOSIS — M25.511 ACUTE PAIN OF RIGHT SHOULDER: Primary | ICD-10-CM

## 2022-10-03 DIAGNOSIS — I10 HYPERTENSION, UNSPECIFIED TYPE: ICD-10-CM

## 2022-10-03 PROCEDURE — 99213 OFFICE O/P EST LOW 20 MIN: CPT | Performed by: INTERNAL MEDICINE

## 2022-10-03 RX ORDER — MELOXICAM 15 MG/1
15 TABLET ORAL DAILY
COMMUNITY
End: 2022-11-01

## 2022-10-03 NOTE — PROGRESS NOTES
HISTORY OF PRESENT ILLNESS  Melani Kennedy is a 57 y.o. male. HPI  Patient complains of right shoulder pain x about 1 week. The pain begins in his  right scapula, extends down right lateral arm down to his elbow. He denies neck injury but he has stiffness in his neck with extension. He denies arm weakness or paresthesias, and arm symptoms are not precipitated by neck movement. He has pain when he tries to lift his right arm. Patient works at [employer] up to 20 lbs. He denies  known injury. He has taken Tylenol, and Meloxicam without significant improvement. He had surgery on his right shoulder with Dr. Kailey Farley 2016. Patient Active Problem List   Diagnosis Code    Essential hypertension, benign I10    Anemia, unspecified D64.9    Benign neoplasm of colon D12.6    Thalassemia trait D56.3    Depressive disorder, not elsewhere classified F32.89    Abnormal thyroid blood test R79.89    Impotence of organic origin N52.9    Positive PPD R76.11    Encounter for long-term (current) use of other medications Z79.899    Unspecified hemorrhoids without mention of complication Z90.6    Elevated prostate specific antigen (PSA) R97.20    Tobacco use disorder F17.200    Other and unspecified hyperlipidemia E78.5    Elevated LDL cholesterol level E78.00    IFG (impaired fasting glucose) R73.01    Vitamin D deficiency E55.9     Current Outpatient Medications on File Prior to Visit   Medication Sig Dispense Refill    meloxicam (MOBIC) 15 mg tablet Take 15 mg by mouth daily. amLODIPine (NORVASC) 10 mg tablet TAKE 1 TABLET BY MOUTH EVERY DAY 30 Tablet 5    ergocalciferol (ERGOCALCIFEROL) 1,250 mcg (50,000 unit) capsule TAKE 1 CAPSULE BY MOUTH EVERY SEVEN (7) DAYS. 4 Capsule 5    ACETAMINOPHEN (TYLENOL PO) Take  by mouth. As needed       No current facility-administered medications on file prior to visit.        ROS  Per HPI  Physical Exam  Visit Vitals  /73   Pulse 70   Temp 98.4 °F (36.9 °C) (Temporal) Resp 16   Ht 5' 9.5\" (1.765 m)   Wt 201 lb (91.2 kg)   SpO2 95%   BMI 29.26 kg/m²   Neck: no spinal tenderness, tender base of his neck with para cervical muscle spasm and reduced extension, normal flexion and rotation  Heart[de-identified] normal rate, regular rhythm, normal S1, S2, no murmurs, rubs, clicks or gallops. Chest: clear to auscultation, no wheezes, rales or rhonchi,   Extremities: no edema   Right shoulder: No swelling, warmth, or erythema. Generalized tenderness over biceps, triceps. Reduced extension >90 degrees, pain with flexion > 90 degrees, reduced external rotation  Normal arm strength, and sensation    ASSESSMENT and PLAN  Right Shoulder pain  Patient has had previous surgery and physical therapy on this shoulder. Unable to locate these records for review. Will have him rest x 1 week (out of work note provided), avoid heavy lifting overhead. Recommend extra strength Tylenol 2 tabs every 6 hours for pain  Also has Mobic which he will take for severe pain (reviewed side effects and risks)  Referred to Dr Carine Maurer for additional evaluation and management  Call or return to clinic if these symptoms worsen or fail to improve as anticipated.    Hypertension: controlled

## 2022-10-03 NOTE — PROGRESS NOTES
ASSESSMENT/PLAN:  Below is the assessment and plan developed based on review of pertinent history, physical exam, labs, studies, and medications. 1. Shoulder pain, unspecified chronicity, unspecified laterality  -     XR SHOULDER RT AP/LAT MIN 2 V; Future  2. Neck pain  -     XR SPINE CERV PA LAT ODONT 3 V MAX; Future  3. Radiculitis of right cervical region  -     methylPREDNISolone (MEDROL DOSEPACK) 4 mg tablet; Take 1 Tablet by mouth Specific Days and Specific Times. Used as directed, Normal, Disp-1 Dose Pack, R-0  -     cyclobenzaprine (FLEXERIL) 5 mg tablet; Take 1 Tablet by mouth three (3) times daily. , Normal, Disp-30 Tablet, R-0  -     REFERRAL TO PHYSICAL THERAPY      Return in about 6 weeks (around 11/15/2022) for Referral to low back and neck specialist.    In discussion with the patient, we considered the numerus possible diagnoses that could be contributing to their present symptoms. We also deliberated on the extensive management options that must be considered to treat their current condition. We reviewed their accessible prior medical records, diagnostic tests, and current health and employment information. We considered how these symptoms were affecting the patient´s activities of daily living as well as employment and fitness activities. The patient had various questions regarding the possible risks, benefits, complications, morbidity and mortality regarding their diagnosis and treatment options. The patients´ comorbidities were considered, and I advocated that they consider maximizing lifestyle modification through nutrition and exercise to aid in addressing their symptoms. Shared decision making yielded an understanding to move forward with conservation treatment preferences.  The patient expressed understanding that if conservative management fails to alleviate the present symptoms they will return to office for re-evaluation and consideration of additional diagnostic tests and potential surgical options. In the interim, we have recommended ice, elevation, and take prescription anti-inflammatory medications along with a physician directed home exercise program. We discussed the risks and common side effects of anti-inflammatory medications and instructed the patient to discontinue the medication and contact us if they experienced any side effects. The patient was encouraged to discuss the possible side effects with their family physician or pharmacist prior to initiating any new medications. We discussed the fact that many of the recommended treatment options presented are significantly limited by the patient´s social determinants of health. We also reviewed the circumstances surrounding the environment that they live and work which affect a wide range of health risk. We considered the limited access to appropriate educational resources regarding proper nutrition and exercise as well as the economic and social support necessary to maintain health and wellbeing. Given that the patient's symptoms are increasing in frequency and duration we have decided to prescribe physical therapy. We talked about the fact that the goal of physical therapy is for the therapist to assist in developing a program to help return the patient to full strength, function and mobility and decrease pain. We also discussed that the therapist may combine several techniques to help decrease pain. These include but are not limited to stretching, balance exercises, strength training, massage, cold and heat therapy, and electrical stimulation. Although, physical therapy is generally safe, we went over the potential risks to include the worsening of pre-existing conditions, continued pain and no improvement in flexibility, mobility, and strength. We will have the patient follow up after physical therapy to closely monitor their progress. We talked about following up sooner if therapy is not progressing on a weekly basis. After a long discussion regarding treatment options, we have decided to prescribe an oral medication. We discussed the risks and common side effects of the medication and instructed the patient to discontinue the medication and contact us if they experienced any side effects. We also encouraged the patient to discuss the possible side effects with their family physician or pharmacist prior to initiating any new medications. We talked about the fact that we were concerned that he had some radiculitis from his neck down to his right arm. We will start some physical therapy as well as some medications. I did give him a referral to one of our neck specialist.    SUBJECTIVE/OBJECTIVE:  Carlos Eduardo Kennedy (: 1960) is a 57 y.o. male, patient,here for evaluation of the Shoulder Pain (Right radiating shoulder pain that has progressed over the past week)  . Patient presents for evaluation for right shoulder upper back and right arm pain. Patient states the pain began insidiously a few weeks ago. He denies any trauma or accidents to the right arm. He endorses working in an [employer] center and is right-hand dominant. He endorses radiating symptoms down the right arm to his mid forearm. He denies numbness and tingling. Of note, the patient had right shoulder surgery in 2016. PHYSICAL EXAM:    Upon examination of the right shoulder, the patient sits with the scapula protracted and depressed. They are tender to palpation over the trapezius and rhomboids. They are non-tender to palpation over the neck, clavicle, and elbow. There is no soft tissue swelling and ecchymosis. The patient has full range of motion of the shoulder. The shoulder is stable on exam. They have 4/5 strength, and are neurovascularly intact distally. There is no erythema, warmth or skin lesions present.     IMAGING:    X-rays performed today in the office 3 views of the right shoulder demonstrate no obvious fractures or dislocations. X-rays performed today in the office 2 views of the cervical spine demonstrate significant degenerative changes more pronounced in the lower cervical vertebrae. Allergies   Allergen Reactions    Lactose Diarrhea    Lisinopril Cough       Current Outpatient Medications   Medication Sig    methylPREDNISolone (MEDROL DOSEPACK) 4 mg tablet Take 1 Tablet by mouth Specific Days and Specific Times. Used as directed    cyclobenzaprine (FLEXERIL) 5 mg tablet Take 1 Tablet by mouth three (3) times daily. meloxicam (MOBIC) 15 mg tablet Take 15 mg by mouth daily. amLODIPine (NORVASC) 10 mg tablet TAKE 1 TABLET BY MOUTH EVERY DAY    ergocalciferol (ERGOCALCIFEROL) 1,250 mcg (50,000 unit) capsule TAKE 1 CAPSULE BY MOUTH EVERY SEVEN (7) DAYS. ACETAMINOPHEN (TYLENOL PO) Take  by mouth. As needed     No current facility-administered medications for this visit.        Past Medical History:   Diagnosis Date    Anemia NEC     chronic    Hypertension     Thalassemia trait        Past Surgical History:   Procedure Laterality Date    ENDOSCOPY, COLON, DIAGNOSTIC      09,due 12    HX HEENT      cyst on forehead       Family History   Problem Relation Age of Onset    Cancer Mother         colon    Cancer Father         prostate    Cancer Brother         prostate    Liver Disease Brother 67        failure due to malaria       Social History     Socioeconomic History    Marital status:      Spouse name: Not on file    Number of children: Not on file    Years of education: Not on file    Highest education level: Not on file   Occupational History    Not on file   Tobacco Use    Smoking status: Former     Packs/day: 1.00     Years: 10.00     Pack years: 10.00     Types: Cigarettes     Quit date: 2015     Years since quittin.7    Smokeless tobacco: Never   Vaping Use    Vaping Use: Never used   Substance and Sexual Activity    Alcohol use: Yes     Comment: 1 drink per week    Drug use: No    Sexual activity: Yes     Partners: Female     Birth control/protection: None   Other Topics Concern    Not on file   Social History Narrative    Not on file     Social Determinants of Health     Financial Resource Strain: Not on file   Food Insecurity: Not on file   Transportation Needs: Not on file   Physical Activity: Not on file   Stress: Not on file   Social Connections: Not on file   Intimate Partner Violence: Not on file   Housing Stability: Not on file       Review of Systems    No flowsheet data found. Vitals:  Ht 5' 9\" (1.753 m)   Wt 196 lb (88.9 kg)   BMI 28.94 kg/m²    Body mass index is 28.94 kg/m². An electronic signature was used to authenticate this note.   -- Bhanu Field MD

## 2022-10-03 NOTE — PROGRESS NOTES
Verified name and birth date for privacy precautions. Chart reviewed in preparation for today's visit. Chief Complaint   Patient presents with    Back Pain     Right shoulder pain x 1 week, had surgery 7 years ago in this area           Health Maintenance Due   Topic    Shingrix Vaccine Age 50> (1 of 2)    Colorectal Cancer Screening Combo     COVID-19 Vaccine (3 - Booster for Pfizer series)    DTaP/Tdap/Td series (2 - Td or Tdap)    Flu Vaccine (1)         Wt Readings from Last 3 Encounters:   10/03/22 201 lb (91.2 kg)   05/20/22 199 lb (90.3 kg)   11/03/21 198 lb (89.8 kg)     Temp Readings from Last 3 Encounters:   05/20/22 98.1 °F (36.7 °C)   11/03/21 97.5 °F (36.4 °C) (Temporal)   10/05/20 97.5 °F (36.4 °C) (Temporal)     BP Readings from Last 3 Encounters:   05/20/22 139/73   11/03/21 136/73   12/31/19 121/57     Pulse Readings from Last 3 Encounters:   05/20/22 72   11/03/21 98   12/31/19 74         Learning Assessment:  :     Learning Assessment 4/19/2018 2/18/2015 11/11/2014   PRIMARY LEARNER Patient Patient Patient   PRIMARY LANGUAGE ENGLISH ENGLISH ENGLISH   LEARNER PREFERENCE PRIMARY OTHER (COMMENT) LISTENING LISTENING   ANSWERED BY patient self self   RELATIONSHIP SELF SELF SELF       Depression Screening:  :     3 most recent PHQ Screens 10/3/2022   Little interest or pleasure in doing things Not at all   Feeling down, depressed, irritable, or hopeless Not at all   Total Score PHQ 2 0       Fall Risk Assessment:  :     No flowsheet data found. Abuse Screening:  :     Abuse Screening Questionnaire 10/3/2022 11/3/2021 1/27/2016   Do you ever feel afraid of your partner? N N N   Are you in a relationship with someone who physically or mentally threatens you? N N N   Is it safe for you to go home?  [facility]

## 2022-10-03 NOTE — PATIENT INSTRUCTIONS
You are being referred to Dr. Caroline Machado for your right shoulder pain  Recommend extra strength Tylenol 2 tabs every 6 hours for pain  Avoid heavy lifting overhead until you are evaluated by Dr. Srinivasa Villanueva. Out of work note provided for 1 week  Call or return to clinic prn if these symptoms worsen or fail to improve as anticipated.
The patient is a 21y Female complaining of abdominal pain.

## 2022-10-03 NOTE — LETTER
NOTIFICATION RETURN TO WORK / SCHOOL    10/3/2022 2:04 PM    Mr[phone]      To Whom It May Concern: Carlos Eduardo Kennedy is currently under the care of [facility]. He should stay out of work until 10/10/22. If there are questions or concerns please have the patient contact our office.         Sincerely,      Tabby Alvarez MD

## 2022-10-04 ENCOUNTER — OFFICE VISIT (OUTPATIENT)
Dept: ORTHOPEDIC SURGERY | Age: 62
End: 2022-10-04
Payer: COMMERCIAL

## 2022-10-04 VITALS — HEIGHT: 69 IN | WEIGHT: 196 LBS | BODY MASS INDEX: 29.03 KG/M2

## 2022-10-04 DIAGNOSIS — M54.12 RADICULITIS OF RIGHT CERVICAL REGION: ICD-10-CM

## 2022-10-04 DIAGNOSIS — M25.519 SHOULDER PAIN, UNSPECIFIED CHRONICITY, UNSPECIFIED LATERALITY: Primary | ICD-10-CM

## 2022-10-04 DIAGNOSIS — M54.2 NECK PAIN: ICD-10-CM

## 2022-10-04 PROCEDURE — 99204 OFFICE O/P NEW MOD 45 MIN: CPT | Performed by: ORTHOPAEDIC SURGERY

## 2022-10-04 RX ORDER — CYCLOBENZAPRINE HCL 5 MG
5 TABLET ORAL 3 TIMES DAILY
Qty: 30 TABLET | Refills: 0 | Status: SHIPPED | OUTPATIENT
Start: 2022-10-04 | End: 2022-11-01

## 2022-10-04 RX ORDER — METHYLPREDNISOLONE 4 MG/1
4 TABLET ORAL
Qty: 1 DOSE PACK | Refills: 0 | Status: SHIPPED | OUTPATIENT
Start: 2022-10-04 | End: 2022-11-01

## 2022-10-04 NOTE — LETTER
NOTIFICATION RETURN TO WORK / SCHOOL    10/4/2022 10:52 AM    Mr. [phone]      To Whom It May Concern: Carlos Eduardo Kennedy is currently under the care of [facility]. Please excuse the patient from work as he is being treated for his neck and right-sided pain. We will see him back in 6 weeks time to evaluate his progress. If there are questions or concerns please have the patient contact our office.         Sincerely,      Renee Ortiz MD

## 2022-10-05 ENCOUNTER — OFFICE VISIT (OUTPATIENT)
Dept: ORTHOPEDIC SURGERY | Age: 62
End: 2022-10-05
Payer: COMMERCIAL

## 2022-10-05 DIAGNOSIS — M54.2 NECK PAIN: ICD-10-CM

## 2022-10-05 DIAGNOSIS — M25.519 SHOULDER PAIN, UNSPECIFIED CHRONICITY, UNSPECIFIED LATERALITY: Primary | ICD-10-CM

## 2022-10-05 DIAGNOSIS — M54.12 RADICULITIS OF RIGHT CERVICAL REGION: ICD-10-CM

## 2022-10-05 PROCEDURE — 20561 NDL INSJ W/O NJX 3+ MUSC: CPT | Performed by: PHYSICAL THERAPIST

## 2022-10-05 PROCEDURE — 97530 THERAPEUTIC ACTIVITIES: CPT | Performed by: PHYSICAL THERAPIST

## 2022-10-05 PROCEDURE — 97161 PT EVAL LOW COMPLEX 20 MIN: CPT | Performed by: PHYSICAL THERAPIST

## 2022-10-05 NOTE — PROGRESS NOTES
Patient Name: Manuel Becerril Any  Date:10/6/2022  : 1960  [x]  Patient  Verified  Payor: BLUE CROSS / Plan: [address] [place] [address] / Product Type: PPO /      Total Treatment Time (min): 60  Total Timed Codes (min): 60    Visit #: 1 of 12  Referring Provider: Camila Berry MD      ICD-10-CM ICD-9-CM    1. Shoulder pain, unspecified chronicity, unspecified laterality  M25.519 719.41       2. Neck pain  M54.2 723.1       3. Radiculitis of right cervical region  M54.12 723.4          Cervical Physical THERAPY EVALUATION    Subjective   Patient is a 57-year-old gentleman referred to physical therapy by Dr. Janeth Weinstein with a diagnosis of right cervical radiculitis neck pain and right shoulder pain. The patient describes right periscapular and upper back symptoms with radiation into the right arm. He reports symptoms began insidiously approximately 3 weeks ago. Reports no trauma or accidents with the right upper extremity but does report a repetitive functional work based environment utilizing the right upper extremity. He is right-hand dominant. Reports radiating symptoms to the right forearm. His past medical history does include a right shoulder arthroscopic rotator cuff repair in 2016. OBJECTIVE:    GAIT:NONANTALGIC    Posture: reveals moderate upper thoracic kyphosis with forward head posture, he is maintaining significant cervical flexion    Pain: 10 /10 with    NEUROLOGIC:    2+ upper extremity reflexes at C5-6 and 7. INTACT LIGHT TOUCH    His gross motor strength is normal distally at C6-7 T1. He does have pain with testing of C5 and C4 with shoulder testing but appears more pain fully related rather than pure neurological muscular weakness. Palpation: SOFT TISSUE DENSITY TURGOR NOTED AT THE right rhomboid upper trap and levator.     Segmental mobility: He has reduced side glide mobility right C4-5-6 T-spine extension restriction as below    Reduced PA mobility of the mid thoracic segments, T4-T9    OA FLEXION RESTRICTION    REDUCED LATERAL GLIDES MID AND LOWER CERVICAL SPINE    Range of motion CERVICAL SPINE    Flexion: Chin to chest    Extension: Unable to get neutral    Side bending: LEFT 15 degrees RIGHT 10 degrees    ROTATION: LEFT 40 degrees RIGHT 30 degrees    Thoracic extension and rotation restriction is noted from norms. Special tests:    Spurling's exam: Positive reproduction of right upper extremity    Treatment:  Full cervico-thoracic evaluation. 20 min  Therapeutic activity instruction 15 minutes  The patient was instructed in a detailed home exercise program to begin with posture education active cervical mobility, passive soft tissue stretching to the supporting periarticular structures of the C-spine. We discussed self-directed modality application for pain relief including heat and cold. Mobilization to the mid and lower cervical spine sidebending rotation as well as mobilization to the thoracic spine and rib. Dry needling 3 muscles upper trapezius levator trap rhomboid upper  Consent for Dry Needling was received      25 minutes of neuromuscular reeducation\Dry needling was performed to rhomboid levator and upper trapezius  70% isopropyl alcohol wipe down to the region was completed. 2\40       mm  needles were used in the each of the aforementioned muscles      Intermittent winding of the needles was completed during the course of treatment   Constant electrical stimulation. 3 MHz, 250 ms for 15 minutes was applied to the needles. The patient exhibited no adverse reaction to the needling. Assessment:   Patient presents with right C4 5, C5-6 radiculopathy right upper extremity he has maintained flexion posture tolerated the dry needling without adverse reaction but has significant pain reproduction with Spurling's we will progress with traction based activities dry needling home program and posture education      ICD-10-CM ICD-9-CM    1.  Shoulder pain, unspecified chronicity, unspecified laterality  M25.519 719.41       2. Neck pain  M54.2 723.1       3. Radiculitis of right cervical region  M54.12 723.4        Long-term goals 8 weeks  1. The patient with active range of motion of the Cervical spine to allow for driving the automobile without difficulty. .  2.  The patient reports pain to be 0/10 with functional ADLs. 3.  The patient will demonstrate posture with good scapular retraction and depression without substitution during active range of motion. 4. 5 point improvement in neck disability index    Short-term goal 4 weeks. 1.  Improve passive range of motion by 30% from baseline values. 2.  Reduce pain by 50% from baseline reports. 3.  The patient will demonstrate independence with home exercise program to facilitate recovery. Plan of care:    Treatment frequency 1-2X weekly. Duration 12 visits. Focus of therapy will be on progressive restoration of range of motion and strength, balance, and functional mobility. Therapeutic applications will include but are not limited to:  Home exercise program development and implementation with updating as needed. Intramuscular dry needling to the involved region. Manual therapy, joint mobilization, myofascial release, therapeutic exercises. Modalities including ultrasound and electric stimulation heat and ice. Kinesiotape and Amos taping for joint reeducation and approximation of tissue for neuromuscular reeducation      The referring physician has reviewed and approved this evaluation and plan of care as noted by the electronic signature attached to note.

## 2022-10-10 ENCOUNTER — OFFICE VISIT (OUTPATIENT)
Dept: ORTHOPEDIC SURGERY | Age: 62
End: 2022-10-10
Payer: COMMERCIAL

## 2022-10-10 DIAGNOSIS — M25.519 SHOULDER PAIN, UNSPECIFIED CHRONICITY, UNSPECIFIED LATERALITY: Primary | ICD-10-CM

## 2022-10-10 DIAGNOSIS — M54.2 NECK PAIN: ICD-10-CM

## 2022-10-10 DIAGNOSIS — M54.12 RADICULITIS OF RIGHT CERVICAL REGION: ICD-10-CM

## 2022-10-10 PROCEDURE — 97012 MECHANICAL TRACTION THERAPY: CPT | Performed by: PHYSICAL THERAPIST

## 2022-10-10 PROCEDURE — 97140 MANUAL THERAPY 1/> REGIONS: CPT | Performed by: PHYSICAL THERAPIST

## 2022-10-10 PROCEDURE — 20561 NDL INSJ W/O NJX 3+ MUSC: CPT | Performed by: PHYSICAL THERAPIST

## 2022-10-10 PROCEDURE — 97110 THERAPEUTIC EXERCISES: CPT | Performed by: PHYSICAL THERAPIST

## 2022-10-12 NOTE — PROGRESS NOTES
PT DAILY TREATMENT NOTE    Patient Name: Kavin Ríos Any  Date:10/12/2022  : 1960  [x]  Patient  Verified  Payor: BLUE CROSS / Plan: [address] [place] [address] / Product Type: PPO /      Total Treatment Time (min): 60  Total Timed Codes (min): 60    Visit #: 2      Referring provider:Sera Keating MD      ASSESSMENT/PLAN:  The patient tolerated treatment well. The needling was effective for reducing his pain and improving his cervical mobility. We initiated over door cervical traction which also reduce the patient's pain with improved mobility. Traction was applied and a flexion bias. We will continue to work with manual based treatment progressive postural reeducation and dry needling  - We will continue with physical therapy per current plan of care with progression towards functional goals. 1. Shoulder pain, unspecified chronicity, unspecified laterality  2. Neck pain  3. Radiculitis of right cervical region      Return in about 3 days (around 10/13/2022) for [address] [place] [address] physical therapy. SUBJECTIVE/OBJECTIVE:  HPI  C-6/C7 radicular symptoms    Physical Exam    Manual Therapy: (25 minutes)  Myofascial release and mobilization to the right upper trapezius levator and rhomboid. Mobilization to the C5-6-7 segments for sidebend rotation. Hold relax stretching to the upper trap and levator  Manual cervical traction grade 2 grade 3    Mechanical traction: (15Minutes)  Over door mechanical traction instruction application. 10 pounds of weight applied in a flexion bias posture      Therapeutic Exercise: ( minutes)  Strength/Endurance/Exercises supervised and completed seS below        Modalities Applied: (20 minutes)    Dry needling 3 muscles upper trapezius levator trap rhomboid upper  Consent for Dry Needling was [id number]          An electronic signature was used to authenticate this note.     -- ARLETTE Dyer

## 2022-10-14 ENCOUNTER — OFFICE VISIT (OUTPATIENT)
Dept: ORTHOPEDIC SURGERY | Age: 62
End: 2022-10-14
Payer: COMMERCIAL

## 2022-10-14 DIAGNOSIS — M54.12 RADICULITIS OF RIGHT CERVICAL REGION: ICD-10-CM

## 2022-10-14 DIAGNOSIS — M54.2 NECK PAIN: ICD-10-CM

## 2022-10-14 DIAGNOSIS — M25.519 SHOULDER PAIN, UNSPECIFIED CHRONICITY, UNSPECIFIED LATERALITY: Primary | ICD-10-CM

## 2022-10-14 PROCEDURE — 20561 NDL INSJ W/O NJX 3+ MUSC: CPT | Performed by: PHYSICAL THERAPIST

## 2022-10-14 PROCEDURE — 97140 MANUAL THERAPY 1/> REGIONS: CPT | Performed by: PHYSICAL THERAPIST

## 2022-10-14 PROCEDURE — 97012 MECHANICAL TRACTION THERAPY: CPT | Performed by: PHYSICAL THERAPIST

## 2022-10-14 NOTE — PROGRESS NOTES
PT DAILY TREATMENT NOTE    Patient Name: Sydnee Perez Any  Date:10/16/2022  : 1960  [x]  Patient  Verified  Payor: [employer] CROSS / Plan: [address] [place] [address] / Product Type: PPO /      Total Treatment Time (min): 60  Total Timed Codes (min): 60    Visit #: 2      Referring provider:Kat Keating MD      ASSESSMENT/PLAN:  The patient tolerated treatment well. Patient continues to have radicular based pain in the mechanical traction and other aspects of care are reducing his severity intensity of pain but he does continue to have symptoms with encouraged him to purchase an over-the-door traction unit for home application. Added and reviewed axial chin tucks  - We will continue with physical therapy per current plan of care with progression towards functional goals. 1. Shoulder pain, unspecified chronicity, unspecified laterality  2. Neck pain  3. Radiculitis of right cervical region      Return in about 5 days (around 10/19/2022) for [address] [place] [address] physical therapy. SUBJECTIVE/OBJECTIVE:  HPI  C-6/C7 radicular symptoms    Physical Exam    Manual Therapy: (15 minutes)  Myofascial release and mobilization to the right upper trapezius levator and rhomboid. Mobilization to the C5-6-7 segments for sidebend rotation. Hold relax stretching to the upper trap and levator  Manual cervical traction grade 2 grade 3    Mechanical traction: (20 Minutes)  Over door mechanical traction instruction application. 10 pounds of weight applied in a flexion bias posture      Therapeutic Exercise: ( 5 minutes)  Strength/Endurance/Exercises supervised and completed seS below        Modalities Applied: (20 minutes)  Consent for Dry Needling was received    20 minutes of neuromuscular reeducation\Dry needling was performed to   Upper trapezius  Levator  Rhomboid  70% isopropyl alcohol wipe down to the region was completed.   2\40       mm  needles were used in the aforementioned musculature-25 to 30 mm in depth  Intermittent winding of the needles was completed during the course of treatment   Constant electrical stimulation. 3 MHz, 250 ms for 15 minutes was applied to the needles. The patient exhibited no adverse reaction to the needling. Consent for Dry Needling was [email or web address]          An electronic signature was used to authenticate this note.     -- ARLETTE Conley

## 2022-10-18 ENCOUNTER — OFFICE VISIT (OUTPATIENT)
Dept: ORTHOPEDIC SURGERY | Age: 62
End: 2022-10-18
Payer: COMMERCIAL

## 2022-10-18 DIAGNOSIS — M25.511 RIGHT SHOULDER PAIN, UNSPECIFIED CHRONICITY: ICD-10-CM

## 2022-10-18 DIAGNOSIS — M54.2 NECK PAIN: Primary | ICD-10-CM

## 2022-10-18 DIAGNOSIS — M54.12 RADICULITIS OF RIGHT CERVICAL REGION: ICD-10-CM

## 2022-10-18 PROCEDURE — 97140 MANUAL THERAPY 1/> REGIONS: CPT | Performed by: PHYSICAL THERAPIST

## 2022-10-18 NOTE — PROGRESS NOTES
PT DAILY TREATMENT NOTE    Patient Name: Priyank Dejesus Any  Date:10/18/2022  : 1960  [x]  Patient  Verified  Payor: BLUE CROSS / Plan: [address] [place] [address] / Product Type: PPO /      Total Treatment Time (min): 40  Total Timed Codes (min): 40    Visit #: 3      Referring provider:Louise Keating MD      ASSESSMENT/PLAN:  The patient tolerated treatment well. Patient continues to have point tenderness and pain along the medial aspect of the right scapula. His cervical range of motion has shown significant improvement as he is now able to extend the C-spine to neutral when he presented to therapy had more than 20 degrees of cervical flexion 1 is unable to extend at all he does continue to have some radicular based symptoms along the posterior and lateral upper and lower arm. He continues to demonstrate no weakness or reflex changes at C5-6 and 7. He has been tolerating her current treatment and exercise well focus today on the manual based care with moist heat posttreatment we will look at dry needling him at the next visit  - We will continue with physical therapy per current plan of care with progression towards functional goals. 1. Neck pain  2. Radiculitis of right cervical region  3. Right shoulder pain, unspecified chronicity      Return in about 2 days (around 10/20/2022) for CONTIUED SKILLED physical therapy. SUBJECTIVE/OBJECTIVE:  HPI  Ports a 50% improvement in pain and function since initial PT. He reports he is now able to extend the head neutral wear a hat to maintain a cervically flexed posture previously    Physical Exam    Manual Therapy: (30 minutes)  Myofascial release and mobilization to the right upper trapezius levator and rhomboid. Mobilization to the C5-6-7 segments for sidebend rotation.   Hold relax stretching to the upper trap and levator  Manual cervical traction grade 2 grade 3    Moist heat 10 minutes was applied to the right periscapular and shoulder area in prone following manual based treatment    An electronic signature was used to authenticate this note.     -- EDWIGE GomezT

## 2022-10-20 ENCOUNTER — OFFICE VISIT (OUTPATIENT)
Dept: ORTHOPEDIC SURGERY | Age: 62
End: 2022-10-20
Payer: COMMERCIAL

## 2022-10-20 DIAGNOSIS — M54.12 RADICULITIS OF RIGHT CERVICAL REGION: ICD-10-CM

## 2022-10-20 DIAGNOSIS — M54.2 NECK PAIN: Primary | ICD-10-CM

## 2022-10-20 DIAGNOSIS — M25.511 RIGHT SHOULDER PAIN, UNSPECIFIED CHRONICITY: ICD-10-CM

## 2022-10-20 PROCEDURE — 97140 MANUAL THERAPY 1/> REGIONS: CPT | Performed by: PHYSICAL THERAPIST

## 2022-10-20 PROCEDURE — 20561 NDL INSJ W/O NJX 3+ MUSC: CPT | Performed by: PHYSICAL THERAPIST

## 2022-10-20 PROCEDURE — 97012 MECHANICAL TRACTION THERAPY: CPT | Performed by: PHYSICAL THERAPIST

## 2022-10-20 NOTE — PROGRESS NOTES
PT DAILY TREATMENT NOTE    Patient Name: Manuel Becerril Any  Date:10/20/2022  : 1960  [x]  Patient  Verified  Payor: BLUE CROSS / Plan: [address] [place] [address] / Product Type: PPO /      Total Treatment Time (min): 55  Total Timed Codes (min): 55    Visit #: 4      Referring provider:Trevor Keating MD      ASSESSMENT/PLAN:  The patient tolerated treatment well. Continues with radicular based symptoms. Sensory pattern at C7 and C8 medial scapular border pain remains he has had provement with his cervical mobility. Traction and flexion bias continues to reduce symptoms as well as the intramuscular dry needling. I encouraged him again to get a home cervical traction unit is continue to work with axial extension posture reeducation  - We will continue with physical therapy per current plan of care with progression towards functional goals. 1. Neck pain  2. Radiculitis of right cervical region  3. Right shoulder pain, unspecified chronicity      No follow-ups on file. SUBJECTIVE/OBJECTIVE:  HPI  C-6/C7 radicular symptoms no reflex or motor change. Patient has a 50% improvement in cervical extension from initial evaluation. Physical Exam    Manual Therapy: (20 minutes)  Myofascial release and mobilization to the right upper trapezius levator and rhomboid. Mobilization to the C5-6-7 segments for sidebend rotation. Hold relax stretching to the upper trap and levator  Manual cervical traction grade 2 grade 3    Mechanical traction: (15 Minutes)  Over door mechanical traction instruction application. 10 pounds of weight applied in a flexion bias posture          Modalities Applied: (20 minutes)  Consent for Dry Needling was received    20 minutes of neuromuscular reeducation\Dry needling was performed to   Upper trapezius  Levator  Rhomboid  70% isopropyl alcohol wipe down to the region was completed.   2\40       mm  needles were used in the aforementioned musculature-25 to 30 mm in depth  Intermittent winding of the needles was completed during the course of treatment   Constant electrical stimulation. 3 MHz, 250 ms for 15 minutes was applied to the needles. The patient exhibited no adverse reaction to the needling. Consent for Dry Needling was [id number]          An electronic signature was used to authenticate this note.     -- EDWIGE CorralesT

## 2022-10-25 ENCOUNTER — OFFICE VISIT (OUTPATIENT)
Dept: ORTHOPEDIC SURGERY | Age: 62
End: 2022-10-25
Payer: COMMERCIAL

## 2022-10-25 DIAGNOSIS — M54.12 RADICULITIS OF RIGHT CERVICAL REGION: ICD-10-CM

## 2022-10-25 DIAGNOSIS — M25.511 RIGHT SHOULDER PAIN, UNSPECIFIED CHRONICITY: ICD-10-CM

## 2022-10-25 DIAGNOSIS — M54.2 NECK PAIN: Primary | ICD-10-CM

## 2022-10-25 PROCEDURE — 97140 MANUAL THERAPY 1/> REGIONS: CPT | Performed by: PHYSICAL THERAPIST

## 2022-10-25 NOTE — PROGRESS NOTES
PT DAILY TREATMENT NOTE    Patient Name: Enio Gonzalez Any  Date:10/25/2022  : 1960  [x]  Patient  Verified  Payor: [employer] / Plan: [address] [place] [address] / Product Type: PPO /      Total Treatment Time (min): 25  Total Timed Codes (min): 25    Visit #: 5      Referring provider:Abbey Keating MD      ASSESSMENT/PLAN:  Patient continues to be pleased with his progress he does continue with C6-7 radicular based symptoms in the right upper extremity. His active cervical range of motion though is improved he is now able to hold the head in a neutral position. The patient tolerated treatment well. I still like him to get a home cervical traction unit. We will look at dry needling at his next visit.will continue with physical therapy per current plan of care with progression towards functional goals. 1. Neck pain  2. Radiculitis of right cervical region  3. Right shoulder pain, unspecified chronicity      Return in about 2 days (around 10/27/2022) for CONTIUED SKILLED physical therapy. SUBJECTIVE/OBJECTIVE:  HPI  C-6/C7 radicular symptoms no reflex or motor change. Patient has a 50% improvement in cervical extension from initial evaluation. Physical Exam    Manual Therapy: (25 minutes)  Myofascial release and mobilization to the right upper trapezius levator and rhomboid. Mobilization to the C5-6-7 segments for sidebend rotation. Hold relax stretching to the upper trap and levator  Manual cervical traction grade 2 grade 3  Passive range of motion cervical spine. An electronic signature was used to authenticate this note.     -- ARLETTE Lawrence

## 2022-10-27 ENCOUNTER — OFFICE VISIT (OUTPATIENT)
Dept: ORTHOPEDIC SURGERY | Age: 62
End: 2022-10-27
Payer: COMMERCIAL

## 2022-10-27 DIAGNOSIS — M54.2 NECK PAIN: Primary | ICD-10-CM

## 2022-10-27 DIAGNOSIS — M54.12 RADICULITIS OF RIGHT CERVICAL REGION: ICD-10-CM

## 2022-10-27 DIAGNOSIS — M25.511 RIGHT SHOULDER PAIN, UNSPECIFIED CHRONICITY: ICD-10-CM

## 2022-10-27 PROCEDURE — 97140 MANUAL THERAPY 1/> REGIONS: CPT | Performed by: PHYSICAL THERAPIST

## 2022-10-27 NOTE — PROGRESS NOTES
PT DAILY TREATMENT NOTE    Patient Name: Maty Farley Any  Date:10/27/2022  : 1960  [x]  Patient  Verified  Payor: BLUE CROSS / Plan: [address] [place] [address] / Product Type: PPO /      Total Treatment Time (min): 35  Total Timed Codes (min): 25    Visit #: 5      Referring provider:Iqra Keating MD      ASSESSMENT/PLAN:    Patient is primarily reporting pain with sleeping. He does report some continued radicular symptoms in the right upper extremity but they are improving. His active cervical range of motion is recovering nicely he does have continued radicular based symptoms with cervical extension exacerbated with Spurling's overpressure in that position but overall doing well. We worked extensively today with OA flexion mobilization and passive traction to improve OA joint mobility to reduce pressure at the cervical spine we discussed and advance postural activities to prevent pain at home while sleeping. We will follow him again early next week          1. Neck pain  2. Radiculitis of right cervical region  3. Right shoulder pain, unspecified chronicity      No follow-ups on file. SUBJECTIVE/OBJECTIVE:  HPI  C-6/C7 radicular symptoms no reflex or motor change. Patient has a 70% improvement in cervical range of motion, extension continues to be primary restriction    Physical Exam    Manual Therapy: (25 minutes)  Myofascial release and mobilization to the right upper trapezius levator and rhomboid. Mobilization to the C5-6-7 segments for sidebend rotation. Hold relax stretching to the upper trap and levator  Manual cervical traction grade 2 grade 3  Passive range of motion cervical spine. Moist heat was applied to the upper cervical shoulder region posttreatment        An electronic signature was used to authenticate this note.     -- ARLETTE Chairez

## 2022-11-01 ENCOUNTER — OFFICE VISIT (OUTPATIENT)
Dept: INTERNAL MEDICINE CLINIC | Age: 62
End: 2022-11-01
Payer: COMMERCIAL

## 2022-11-01 VITALS
SYSTOLIC BLOOD PRESSURE: 108 MMHG | BODY MASS INDEX: 28.79 KG/M2 | WEIGHT: 194.4 LBS | RESPIRATION RATE: 20 BRPM | DIASTOLIC BLOOD PRESSURE: 65 MMHG | HEIGHT: 69 IN | OXYGEN SATURATION: 96 % | HEART RATE: 71 BPM | TEMPERATURE: 97.5 F

## 2022-11-01 DIAGNOSIS — K40.90 NON-RECURRENT UNILATERAL INGUINAL HERNIA WITHOUT OBSTRUCTION OR GANGRENE: ICD-10-CM

## 2022-11-01 DIAGNOSIS — R97.20 ELEVATED PROSTATE SPECIFIC ANTIGEN (PSA): ICD-10-CM

## 2022-11-01 DIAGNOSIS — I10 ESSENTIAL HYPERTENSION, BENIGN: ICD-10-CM

## 2022-11-01 DIAGNOSIS — E78.2 MIXED HYPERLIPIDEMIA: ICD-10-CM

## 2022-11-01 DIAGNOSIS — E55.9 VITAMIN D DEFICIENCY: ICD-10-CM

## 2022-11-01 DIAGNOSIS — Z23 NEEDS FLU SHOT: ICD-10-CM

## 2022-11-01 DIAGNOSIS — R42 DIZZINESS: ICD-10-CM

## 2022-11-01 DIAGNOSIS — Z00.00 WELL ADULT ON ROUTINE HEALTH CHECK: Primary | ICD-10-CM

## 2022-11-01 DIAGNOSIS — Z12.11 COLON CANCER SCREENING: ICD-10-CM

## 2022-11-01 DIAGNOSIS — Z23 NEED FOR TETANUS BOOSTER: ICD-10-CM

## 2022-11-01 DIAGNOSIS — R73.03 PREDIABETES: ICD-10-CM

## 2022-11-01 PROCEDURE — 90472 IMMUNIZATION ADMIN EACH ADD: CPT | Performed by: STUDENT IN AN ORGANIZED HEALTH CARE EDUCATION/TRAINING PROGRAM

## 2022-11-01 PROCEDURE — 99396 PREV VISIT EST AGE 40-64: CPT | Performed by: STUDENT IN AN ORGANIZED HEALTH CARE EDUCATION/TRAINING PROGRAM

## 2022-11-01 PROCEDURE — 90471 IMMUNIZATION ADMIN: CPT | Performed by: STUDENT IN AN ORGANIZED HEALTH CARE EDUCATION/TRAINING PROGRAM

## 2022-11-01 PROCEDURE — 90686 IIV4 VACC NO PRSV 0.5 ML IM: CPT | Performed by: STUDENT IN AN ORGANIZED HEALTH CARE EDUCATION/TRAINING PROGRAM

## 2022-11-01 PROCEDURE — 90715 TDAP VACCINE 7 YRS/> IM: CPT | Performed by: STUDENT IN AN ORGANIZED HEALTH CARE EDUCATION/TRAINING PROGRAM

## 2022-11-01 RX ORDER — ACETAMINOPHEN 500 MG
2000 TABLET ORAL DAILY
COMMUNITY

## 2022-11-01 NOTE — PATIENT INSTRUCTIONS
Please call the urologist to schedule repeat evaluation. Please obtain the following vaccines from your local pharmacy. Please ask your pharmacy to fax our office a copy of the vaccination record. Our fax number is [phone].   - shingles vaccines - 2 doses  - Bivalent COVID vaccine      Vaccine Information Statement    Influenza (Flu) Vaccine (Inactivated or Recombinant): What You Need to Know    Many vaccine information statements are available in Urdu and other languages. See www.immunize.org/vis. Hojas de información sobre vacunas están disponibles en español y en muchos otros idiomas. Visite www.immunize.org/vis. 1. Why get vaccinated? Influenza vaccine can prevent influenza (flu). Flu is a contagious disease that spreads around the [place] every year, usually between October and May. Anyone can get the flu, but it is more dangerous for some people. Infants and young children, people 72 years and older, pregnant people, and people with certain health conditions or a weakened immune system are at greatest risk of flu complications. Pneumonia, bronchitis, sinus infections, and ear infections are examples of flu-related complications. If you have a medical condition, such as heart disease, cancer, or diabetes, flu can make it worse. Flu can cause fever and chills, sore throat, muscle aches, fatigue, cough, headache, and runny or stuffy nose. Some people may have vomiting and diarrhea, though this is more common in children than adults. In an average year, thousands of people in the [facility] die from flu, and many more are hospitalized. Flu vaccine prevents millions of illnesses and flu-related visits to the doctor each year. 2. Influenza vaccines     CDC recommends everyone 6 months and older get vaccinated every flu season. Children 6 months through 6years of age may need 2 doses during a single flu season. Everyone else needs only 1 dose each flu season.     It takes about 2 weeks for protection to develop after vaccination. There are many flu viruses, and they are always changing. Each year a new flu vaccine is made to protect against the influenza viruses believed to be likely to cause disease in the upcoming flu season. Even when the vaccine doesnt exactly match these viruses, it may still provide some protection. Influenza vaccine does not cause flu. Influenza vaccine may be given at the same time as other vaccines. 3. Talk with your health care provider    Tell your vaccination provider if the person getting the vaccine:  Has had an allergic reaction after a previous dose of influenza vaccine, or has any severe, life-threatening allergies   Has ever had Guillain-Barré Syndrome (also called GBS)    In some cases, your health care provider may decide to postpone influenza vaccination until a future visit. Influenza vaccine can be administered at any time during pregnancy. People who are or will be pregnant during influenza season should receive inactivated influenza vaccine. People with minor illnesses, such as a cold, may be vaccinated. People who are moderately or severely ill should usually wait until they recover before getting influenza vaccine. Your health care provider can give you more information. 4. Risks of a vaccine reaction    Soreness, redness, and swelling where the shot is given, fever, muscle aches, and headache can happen after influenza vaccination. There may be a very small increased risk of Guillain-Barré Syndrome (GBS) after inactivated influenza vaccine (the flu shot). [place] children who get the flu shot along with pneumococcal vaccine (PCV13) and/or DTaP vaccine at the same time might be slightly more likely to have a seizure caused by fever. Tell your health care provider if a child who is getting flu vaccine has ever had a seizure. People sometimes faint after medical procedures, including vaccination.  Tell your provider if you feel dizzy or have vision changes or ringing in the ears. As with any medicine, there is a very remote chance of a vaccine causing a severe allergic reaction, other serious injury, or death. 5. What if there is a serious problem? An allergic reaction could occur after the vaccinated person leaves the clinic. If you see signs of a severe allergic reaction (hives, swelling of the face and throat, difficulty breathing, a fast heartbeat, dizziness, or weakness), call 9-1-1 and get the person to the nearest hospital.    For other signs that concern you, call your health care provider. Adverse reactions should be reported to the Vaccine Adverse Event Reporting System (VAERS). Your health care provider will usually file this report, or you can do it yourself. Visit the VAERS website at www.vaers. [facility].gov or call 1-177.269.1921. VAERS is only for reporting reactions, and VAERS staff members do not give medical advice. 6. The National Vaccine Injury Compensation Program    The [facility] Vaccine Injury Compensation Program (VICP) is a federal program that was created to compensate people who may have been injured by certain vaccines. Claims regarding alleged injury or death due to vaccination have a time limit for filing, which may be as short as two years. Visit the VICP website at www.[facility].gov/vaccinecompensation or call 5-502.342.7197 to learn about the program and about filing a claim. 7. How can I learn more? Ask your health care provider. Call your local or state health department. Visit the website of the Food and Drug Administration (FDA) for vaccine package inserts and additional information at www.fda.gov/vaccines-blood-biologics/vaccines. Contact the Centers for Disease Control and Prevention (CDC): Call 3-354.182.7586 (8-659-TTD-INFO) or  Visit CDCs influenza website at www.cdc.gov/flu. Vaccine Information Statement   Inactivated Influenza Vaccine   8/6/2021  42 U. S.C. § 359FR-01     [facility] and [facility] for Disease Control and Prevention    Office Use Only

## 2022-11-01 NOTE — PROGRESS NOTES
Kay Parrish is a 57y.o. year old male who is a new patient to me today (11/01/22). He was previous followed by Dr Angeles Goldstein. Assessment & Plan:   1. Well adult on routine health check  Comments:  discussed diet, exercise, HM  Orders:  -     HEMOGLOBIN A1C WITH EAG; Future  -     CBC WITH AUTOMATED DIFF; Future  -     METABOLIC PANEL, COMPREHENSIVE; Future  -     LIPID PANEL; Future  2. Essential hypertension, benign  Assessment & Plan:  Controlled, cont amlodipine. Reports dizziness, asked him to check BP at home. 3. Dizziness  Comments:  orthostasis vs related to poor PO prior to work ([address] [employer] employee). He doesn't feel that it is related to orthostasis. He will try to eat more before w  4. Non-recurrent unilateral inguinal hernia without obstruction or gangrene  Comments:  he would like repair. no hx strangulation  Orders:  -     REFERRAL TO GENERAL SURGERY  5. Needs flu shot  -     INFLUENZA, FLUARIX, FLULAVAL, FLUZONE (AGE 6 MO+), AFLURIA(AGE 3Y+) IM, PF, 0.5 ML  6. Colon cancer screening  -     REFERRAL TO GASTROENTEROLOGY  7. Need for tetanus booster  -     TDAP, BOOSTRIX, (AGE 10 YRS+), IM  8. Elevated prostate specific antigen (PSA)  Assessment & Plan:  Encouraged him to have re-eval with urology. Will defer PSA checks to urology as they may want to use diagnostic PSA or other modalities to determine his risk of prostate cancer   9. Mixed hyperlipidemia  Assessment & Plan:  Although cholesterol values themselves have not been too high in the past, has ASCVD risk is elevated. We discussed factors that go into ASCVD calculation and his ASCVD risk score. He doesn't identify as [place] since he did not grow up in [facility]. We agreed to recheck lipid panel and then I can give the most up to date recommendation regarding statin therapy. 10. Prediabetes  Assessment & Plan:  Check A1c today.  He has fallen out of the pre-diabetic range for nearly 5 years, may decrease frequency of A1c checks in the future to every other year or PRN weight changes. 11. Vitamin D deficiency  Assessment & Plan:   After the visit he requested a refill of D2. His vit D level was mildly low in 2021 and he has been taking D2 weekly for a year. Will stop D2 and use 2000U D3 daily. Health Maintenance   Flu vaccine: will get today   COVID vaccine: discussed bivalent booster  Tetanus vaccine:5/2012  Shingles vaccine: he has been trying to get it from the pharmacy  Pneumonia vaccine:  Colon cancer screening: due, will refer   PSA: defer   AAA screening: @65  Lung cancer screening: does not qualify based on pack/years  Hep C: 2017  Lipid: check today  DM: check today   Healthcare decision maker: wife   ACP:      RTC: 1 year or sooner PRN    Subjective:   Melani Roman was seen today for Establish Care    HTN - taking amlodipine, doesn't check BP at home. HLD -   The 10-year ASCVD risk score (Андрей PERSAUD, et al., 2019) is: 11%    Values used to calculate the score:      Age: 57 years      Sex: Male      Is Non- : Yes      Diabetic: No      Tobacco smoker: No      Systolic Blood Pressure: 700 mmHg      Is BP treated: Yes      HDL Cholesterol: 54 MG/DL      Total Cholesterol: 189 MG/DL    Elevated PSA - advised to see urology but he was not comfortable with the procedure that he was offered. He has not returned. He would be interested in treatment for prostate cancer if it was indicated. Shoulder pain, cervical radiculopathy- tylenol, mobic for severe pain, ortho, PT    Feeling dizzy when squatting and then standing, this is worse if he hasn't eaten as much before work. Hernia L groin - painful when he walks a lot, notes it getting larger. It is reducible. Review of Systems   All other systems reviewed and are negative.     PMHx    Patient Active Problem List   Diagnosis Code    Essential hypertension, benign I10    Anemia, unspecified D64.9    Benign neoplasm of colon D12.6 Thalassemia trait D56.3    Impotence of organic origin N52.9    Positive PPD R76.11    Unspecified hemorrhoids without mention of complication Z55.1    Elevated prostate specific antigen (PSA) R97.20    Mixed hyperlipidemia E78.2    Prediabetes R73.03    Vitamin D deficiency E55.9       Prior to Admission medications    Medication Sig Start Date End Date Taking? Authorizing Provider   cholecalciferol (VITAMIN D3) (2,000 UNITS /50 MCG) cap capsule Take 2,000 Units by mouth daily. Yes Provider, Historical   amLODIPine (NORVASC) 10 mg tablet TAKE 1 TABLET BY MOUTH EVERY DAY 6/7/22  Yes Khurram Michaud MD   ACETAMINOPHEN (TYLENOL PO) Take  by mouth. As needed   Yes Provider, Historical   methylPREDNISolone (MEDROL DOSEPACK) 4 mg tablet Take 1 Tablet by mouth Specific Days and Specific Times. Used as directed 10/4/22 11/1/22  Trish Harden MD   cyclobenzaprine (FLEXERIL) 5 mg tablet Take 1 Tablet by mouth three (3) times daily. 10/4/22 11/1/22  Trish Harden MD   meloxicam (MOBIC) 15 mg tablet Take 15 mg by mouth daily. 11/1/22  Provider, Historical   ergocalciferol (ERGOCALCIFEROL) 1,250 mcg (50,000 unit) capsule TAKE 1 CAPSULE BY MOUTH EVERY SEVEN (7) DAYS. 5/3/22 11/1/22  Ellen Sandoval MD       The following sections were reviewed & updated as appropriate: Problem List, Allergies, PMH, PSH, FH, and SH. Objective:   Visit Vitals  /65   Pulse 71   Temp 97.5 °F (36.4 °C) (Temporal)   Resp 20   Ht 5' 9\" (1.753 m)   Wt 194 lb 6.4 oz (88.2 kg)   SpO2 96%   BMI 28.71 kg/m²       Physical Exam  Constitutional:       General: He is not in acute distress. Eyes:      Extraocular Movements: Extraocular movements intact. Conjunctiva/sclera: Conjunctivae normal.   Cardiovascular:      Rate and Rhythm: Normal rate and regular rhythm. Heart sounds: No murmur heard. Pulmonary:      Effort: Pulmonary effort is normal. No respiratory distress.    Abdominal:      General: Bowel sounds are normal.      Palpations: Abdomen is soft. Musculoskeletal:      Right lower leg: No edema. Left lower leg: No edema. Neurological:      General: No focal deficit present. Mental Status: He is alert.    Psychiatric:         Mood and Affect: Mood normal.         Behavior: Behavior normal.            Margo Apodaca MD

## 2022-11-01 NOTE — ASSESSMENT & PLAN NOTE
Check A1c today. He has fallen out of the pre-diabetic range for nearly 5 years, may decrease frequency of A1c checks in the future to every other year or PRN weight changes.

## 2022-11-01 NOTE — ASSESSMENT & PLAN NOTE
Although cholesterol values themselves have not been too high in the past, has ASCVD risk is elevated. We discussed factors that go into ASCVD calculation and his ASCVD risk score. He doesn't identify as [place] since he did not grow up in [facility]. We agreed to recheck lipid panel and then I can give the most up to date recommendation regarding statin therapy.

## 2022-11-01 NOTE — ASSESSMENT & PLAN NOTE
Encouraged him to have re-eval with urology.  Will defer PSA checks to urology as they may want to use diagnostic PSA or other modalities to determine his risk of prostate cancer

## 2022-11-01 NOTE — ASSESSMENT & PLAN NOTE
After the visit he requested a refill of D2. His vit D level was mildly low in 2021 and he has been taking D2 weekly for a year. Will stop D2 and use 2000U D3 daily.

## 2022-11-02 ENCOUNTER — LAB ONLY (OUTPATIENT)
Dept: INTERNAL MEDICINE CLINIC | Age: 62
End: 2022-11-02

## 2022-11-02 DIAGNOSIS — Z00.00 WELL ADULT ON ROUTINE HEALTH CHECK: ICD-10-CM

## 2022-11-03 LAB
ALBUMIN SERPL-MCNC: 4 G/DL (ref 3.5–5)
ALBUMIN/GLOB SERPL: 1.2 {RATIO} (ref 1.1–2.2)
ALP SERPL-CCNC: 104 U/L (ref 45–117)
ALT SERPL-CCNC: 17 U/L (ref 12–78)
ANION GAP SERPL CALC-SCNC: 6 MMOL/L (ref 5–15)
AST SERPL-CCNC: 9 U/L (ref 15–37)
BASOPHILS # BLD: 0.1 K/UL (ref 0–0.1)
BASOPHILS NFR BLD: 1 % (ref 0–1)
BILIRUB SERPL-MCNC: 0.4 MG/DL (ref 0.2–1)
BUN SERPL-MCNC: 11 MG/DL (ref 6–20)
BUN/CREAT SERPL: 11 (ref 12–20)
CALCIUM SERPL-MCNC: 8.8 MG/DL (ref 8.5–10.1)
CHLORIDE SERPL-SCNC: 106 MMOL/L (ref 97–108)
CHOLEST SERPL-MCNC: 202 MG/DL
CO2 SERPL-SCNC: 28 MMOL/L (ref 21–32)
CREAT SERPL-MCNC: 0.97 MG/DL (ref 0.7–1.3)
DIFFERENTIAL METHOD BLD: ABNORMAL
EOSINOPHIL # BLD: 0.2 K/UL (ref 0–0.4)
EOSINOPHIL NFR BLD: 3 % (ref 0–7)
ERYTHROCYTE [DISTWIDTH] IN BLOOD BY AUTOMATED COUNT: 20.2 % (ref 11.5–14.5)
EST. AVERAGE GLUCOSE BLD GHB EST-MCNC: 111 MG/DL
GLOBULIN SER CALC-MCNC: 3.4 G/DL (ref 2–4)
GLUCOSE SERPL-MCNC: 89 MG/DL (ref 65–100)
HBA1C MFR BLD: 5.5 % (ref 4–5.6)
HCT VFR BLD AUTO: 35.9 % (ref 36.6–50.3)
HDLC SERPL-MCNC: 51 MG/DL
HDLC SERPL: 4 {RATIO} (ref 0–5)
HGB BLD-MCNC: 10.6 G/DL (ref 12.1–17)
IMM GRANULOCYTES # BLD AUTO: 0 K/UL (ref 0–0.04)
IMM GRANULOCYTES NFR BLD AUTO: 0 % (ref 0–0.5)
LDLC SERPL CALC-MCNC: 136.8 MG/DL (ref 0–100)
LYMPHOCYTES # BLD: 1.6 K/UL (ref 0.8–3.5)
LYMPHOCYTES NFR BLD: 22 % (ref 12–49)
MCH RBC QN AUTO: 17.8 PG (ref 26–34)
MCHC RBC AUTO-ENTMCNC: 29.5 G/DL (ref 30–36.5)
MCV RBC AUTO: 60.1 FL (ref 80–99)
MONOCYTES # BLD: 0.7 K/UL (ref 0–1)
MONOCYTES NFR BLD: 10 % (ref 5–13)
NEUTS SEG # BLD: 4.6 K/UL (ref 1.8–8)
NEUTS SEG NFR BLD: 64 % (ref 32–75)
NRBC # BLD: 0 K/UL (ref 0–0.01)
NRBC BLD-RTO: 0 PER 100 WBC
PLATELET # BLD AUTO: 291 K/UL (ref 150–400)
PMV BLD AUTO: ABNORMAL FL (ref 8.9–12.9)
POTASSIUM SERPL-SCNC: 4.1 MMOL/L (ref 3.5–5.1)
PROT SERPL-MCNC: 7.4 G/DL (ref 6.4–8.2)
RBC # BLD AUTO: 5.97 M/UL (ref 4.1–5.7)
RBC MORPH BLD: ABNORMAL
RBC MORPH BLD: ABNORMAL
SODIUM SERPL-SCNC: 140 MMOL/L (ref 136–145)
TRIGL SERPL-MCNC: 71 MG/DL (ref ?–150)
VLDLC SERPL CALC-MCNC: 14.2 MG/DL
WBC # BLD AUTO: 7.2 K/UL (ref 4.1–11.1)

## 2022-11-03 NOTE — PROGRESS NOTES
Please call pt -   Cholesterol is slightly elevated, as we discussed during the visit, this deserves treatment with a cholesterol lowering medication to reduce his risk of heart attack or stroke. His risk of heart attack or stroke is roughly 1 in 10 over 10 years. If he accepts medication, I will call it to his pharmacy. I feel is his already doing most of the diet and lifestyle things that can lower cholesterol, but to review these are:  - maintaining a healthy weight  - reduction of intake of simple carbohydrates, especially sweet foods, beverages, and white pastas/breads  - reduction of saturated fats, processed foods  - avoid alcohol    The rest of his labs (blood counts, kidney function, liver function) are stable. Please confirm he got the message to stop the high dose vitamin D2 weekly and use regular dose vitamin D3, 2000 units daily, available over the counter.     Thanks  Tiesha Rivera MD        The 10-year ASCVD risk score (Андрей PERSAUD, et al., 2019) is: 11.4%    Values used to calculate the score:      Age: 57 years      Sex: Male      Is Non- : Yes      Diabetic: No      Tobacco smoker: No      Systolic Blood Pressure: 858 mmHg      Is BP treated: Yes      HDL Cholesterol: 51 MG/DL      Total Cholesterol: 202 MG/DL

## 2022-11-04 DIAGNOSIS — E78.2 MIXED HYPERLIPIDEMIA: Primary | ICD-10-CM

## 2022-11-04 RX ORDER — ROSUVASTATIN CALCIUM 10 MG/1
10 TABLET, COATED ORAL
Qty: 30 TABLET | Refills: 5 | Status: SHIPPED | OUTPATIENT
Start: 2022-11-04

## 2022-11-04 NOTE — PROGRESS NOTES
Advised pt the following:  His cholesterol is slightly elevated, as we discussed during the visit, this deserves treatment with a cholesterol lowering medication to reduce his risk of heart attack or stroke. His risk of heart attack or stroke is roughly 1 in 10 over 10 years. If he accepts medication, MD will call it to his pharmacy. MD feels he is already doing most of the diet and lifestyle things that can lower cholesterol, but to review these are:   - maintaining a healthy weight   - reduction of intake of simple carbohydrates, especially sweet foods, beverages, and white pastas/breads   - reduction of saturated fats, processed foods   - avoid alcohol     The rest of his labs (blood counts, kidney function, liver function) are stable. We spoke a few days ago on this but reviewed to stop the high dose vitamin D2 weekly and use regular dose vitamin D3, 2000 units daily, available over the counter. Pt agreed to medication for cholesterol. Will forward to MD to send to pharmacy.

## 2022-11-04 NOTE — PROGRESS NOTES
Left detailed message on pt's vm that Rosuvastatin, which is a very good medication to lower cholesterol, was sent to his pharmacy. Advised the biggest side effect is muscle ache, which is usually mild and goes away after a few days on the medication. If he feels his muscle ache is severe or not going away and he needs to stop the medication, please call and let us know.

## 2022-12-05 DIAGNOSIS — I10 ESSENTIAL HYPERTENSION, BENIGN: ICD-10-CM

## 2022-12-05 NOTE — TELEPHONE ENCOUNTER
Requested Prescriptions     Pending Prescriptions Disp Refills    amLODIPine (NORVASC) 10 mg tablet 30 Tablet 5     Sig: Take 1 Tablet by mouth daily.      [facility]pharm[facility] #[id number] [place], [address]  [phone]

## 2022-12-06 RX ORDER — AMLODIPINE BESYLATE 10 MG/1
10 TABLET ORAL DAILY
Qty: 90 TABLET | Refills: 3 | Status: SHIPPED | OUTPATIENT
Start: 2022-12-06

## 2022-12-29 ENCOUNTER — TELEPHONE (OUTPATIENT)
Dept: SURGERY | Age: 62
End: 2022-12-29

## 2022-12-29 NOTE — TELEPHONE ENCOUNTER
Called patient in attempt to schedule an appointment following a referral from Dr. Kierra Rosa for a Unilateral inguinal hernia No answer, left voicemail.

## 2022-12-29 NOTE — TELEPHONE ENCOUNTER
2nd attempt to contact patient and schedule appointment. Contacted patients mobile phone on chart, left voicemail.

## 2023-01-04 ENCOUNTER — TELEPHONE (OUTPATIENT)
Dept: SURGERY | Age: 63
End: 2023-01-04

## 2023-01-04 NOTE — TELEPHONE ENCOUNTER
Called pt to set up appt with Dr Curtis Chen for Recurrent unilateral inguinal hernia - Referred by Segundo Woodall MD.    Will call back to schedule when ready.

## 2023-05-01 ENCOUNTER — OFFICE VISIT (OUTPATIENT)
Dept: INTERNAL MEDICINE CLINIC | Age: 63
End: 2023-05-01
Payer: COMMERCIAL

## 2023-05-01 VITALS
RESPIRATION RATE: 20 BRPM | HEIGHT: 69 IN | BODY MASS INDEX: 30.57 KG/M2 | TEMPERATURE: 98.6 F | HEART RATE: 68 BPM | OXYGEN SATURATION: 96 % | WEIGHT: 206.4 LBS | SYSTOLIC BLOOD PRESSURE: 128 MMHG | DIASTOLIC BLOOD PRESSURE: 73 MMHG

## 2023-05-01 DIAGNOSIS — R97.20 ELEVATED PSA: ICD-10-CM

## 2023-05-01 DIAGNOSIS — R97.20 ELEVATED PROSTATE SPECIFIC ANTIGEN (PSA): ICD-10-CM

## 2023-05-01 DIAGNOSIS — E78.2 MIXED HYPERLIPIDEMIA: Primary | ICD-10-CM

## 2023-05-01 DIAGNOSIS — I10 ESSENTIAL HYPERTENSION, BENIGN: ICD-10-CM

## 2023-05-01 PROCEDURE — 99214 OFFICE O/P EST MOD 30 MIN: CPT | Performed by: STUDENT IN AN ORGANIZED HEALTH CARE EDUCATION/TRAINING PROGRAM

## 2023-05-01 RX ORDER — ROSUVASTATIN CALCIUM 5 MG/1
5 TABLET, COATED ORAL
Qty: 90 TABLET | Refills: 1 | Status: SHIPPED | OUTPATIENT
Start: 2023-05-01

## 2023-05-01 RX ORDER — ACETAMINOPHEN 500 MG
2000 TABLET ORAL DAILY
COMMUNITY

## 2023-05-01 NOTE — PATIENT INSTRUCTIONS
Please take vitamin D3 2000 units daily to maintain vitamin D levels. Please start coenzyme q10 (Co Q 10) according to the package directions for muscle aches. Let me know if your muscle aches do not get better in 1 month. Please bring blood pressure cuff to clinic next visit to compare with ours.

## 2023-05-01 NOTE — ASSESSMENT & PLAN NOTE
He has not seen urology because he didn't want a cystoscopy. We discussed today the higher the PSA value goes, the greater the concern for prostate cancer. He must see a urologist for this evaluation. I will repeat his PSA today to help triage urgency for urology evaluation.

## 2023-05-01 NOTE — ASSESSMENT & PLAN NOTE
He is experiencing muscle ache and fatigue on rosuvastatin, but continued because of his elevated ASCVD risk. Will decrease to 5mg daily, hopefully this helps fatigue. Advised to add CoQ10 if muscle aches persist. Update lipid panel today to assess response. He wants to come back and have another lipid panel in 3 months on the reduced dose.

## 2023-05-02 LAB
CHOLEST SERPL-MCNC: 106 MG/DL
HDLC SERPL-MCNC: 53 MG/DL
HDLC SERPL: 2 (ref 0–5)
LDLC SERPL CALC-MCNC: 43.4 MG/DL (ref 0–100)
PSA SERPL-MCNC: 8.2 NG/ML (ref 0.01–4)
TRIGL SERPL-MCNC: 48 MG/DL (ref ?–150)
VLDLC SERPL CALC-MCNC: 9.6 MG/DL

## 2023-06-11 DIAGNOSIS — E78.2 MIXED HYPERLIPIDEMIA: ICD-10-CM

## 2023-06-12 RX ORDER — ROSUVASTATIN CALCIUM 10 MG/1
TABLET, COATED ORAL
Qty: 30 TABLET | Refills: 5 | OUTPATIENT
Start: 2023-06-12

## 2023-07-03 ENCOUNTER — OFFICE VISIT (OUTPATIENT)
Age: 63
End: 2023-07-03
Payer: COMMERCIAL

## 2023-07-03 VITALS
OXYGEN SATURATION: 98 % | DIASTOLIC BLOOD PRESSURE: 68 MMHG | TEMPERATURE: 97.8 F | HEIGHT: 69 IN | WEIGHT: 198.6 LBS | BODY MASS INDEX: 29.41 KG/M2 | HEART RATE: 58 BPM | SYSTOLIC BLOOD PRESSURE: 116 MMHG | RESPIRATION RATE: 20 BRPM

## 2023-07-03 DIAGNOSIS — E78.2 MIXED HYPERLIPIDEMIA: Primary | ICD-10-CM

## 2023-07-03 DIAGNOSIS — M79.10 MYALGIA: ICD-10-CM

## 2023-07-03 DIAGNOSIS — E78.2 MIXED HYPERLIPIDEMIA: ICD-10-CM

## 2023-07-03 LAB
CHOLEST SERPL-MCNC: 118 MG/DL
CK SERPL-CCNC: 199 U/L (ref 39–308)
HDLC SERPL-MCNC: 52 MG/DL
HDLC SERPL: 2.3 (ref 0–5)
LDLC SERPL CALC-MCNC: 53.4 MG/DL (ref 0–100)
TRIGL SERPL-MCNC: 63 MG/DL
VLDLC SERPL CALC-MCNC: 12.6 MG/DL

## 2023-07-03 PROCEDURE — 3078F DIAST BP <80 MM HG: CPT | Performed by: STUDENT IN AN ORGANIZED HEALTH CARE EDUCATION/TRAINING PROGRAM

## 2023-07-03 PROCEDURE — 3074F SYST BP LT 130 MM HG: CPT | Performed by: STUDENT IN AN ORGANIZED HEALTH CARE EDUCATION/TRAINING PROGRAM

## 2023-07-03 PROCEDURE — 99213 OFFICE O/P EST LOW 20 MIN: CPT | Performed by: STUDENT IN AN ORGANIZED HEALTH CARE EDUCATION/TRAINING PROGRAM

## 2023-07-03 SDOH — ECONOMIC STABILITY: FOOD INSECURITY: WITHIN THE PAST 12 MONTHS, THE FOOD YOU BOUGHT JUST DIDN'T LAST AND YOU DIDN'T HAVE MONEY TO GET MORE.: NEVER TRUE

## 2023-07-03 SDOH — ECONOMIC STABILITY: INCOME INSECURITY: HOW HARD IS IT FOR YOU TO PAY FOR THE VERY BASICS LIKE FOOD, HOUSING, MEDICAL CARE, AND HEATING?: NOT HARD AT ALL

## 2023-07-03 SDOH — ECONOMIC STABILITY: FOOD INSECURITY: WITHIN THE PAST 12 MONTHS, YOU WORRIED THAT YOUR FOOD WOULD RUN OUT BEFORE YOU GOT MONEY TO BUY MORE.: NEVER TRUE

## 2023-07-03 SDOH — ECONOMIC STABILITY: HOUSING INSECURITY
IN THE LAST 12 MONTHS, WAS THERE A TIME WHEN YOU DID NOT HAVE A STEADY PLACE TO SLEEP OR SLEPT IN A SHELTER (INCLUDING NOW)?: NO

## 2023-07-03 ASSESSMENT — PATIENT HEALTH QUESTIONNAIRE - PHQ9
SUM OF ALL RESPONSES TO PHQ9 QUESTIONS 1 & 2: 0
SUM OF ALL RESPONSES TO PHQ QUESTIONS 1-9: 0
2. FEELING DOWN, DEPRESSED OR HOPELESS: 0
SUM OF ALL RESPONSES TO PHQ QUESTIONS 1-9: 0
1. LITTLE INTEREST OR PLEASURE IN DOING THINGS: 0

## 2023-07-03 NOTE — PROGRESS NOTES
Abhishek Shahid is a 57y.o. year old male who presents today (07/03/23) for follow-up. Assessment & Plan:   1. Mixed hyperlipidemia  He would like to update labs now that he has decreased his dose of rosuvastatin to make sure he is still at goal. He is very interested in primary prevention and feels he can tolerate mild myalgias to get the cholesterol lowering benefits. He is not interested in taking CoQ10. Will check CK given his ongoing myalgias to make sure there is no contraindication to continuing statin   -     Lipid Panel; Future  -     CK; Future  2. Myalgia  -     CK; Future     Advised to start OTC D3  Advised to schedule appointment with urology. RTC: 4 mo annual with labs    Subjective:   Patrica Owen was seen today for Hyperlipidemia    HLD  - he is alternating between rosuvastatin 5mg and 10mg every other day because he didn't want to waste the medication and he felt the tab was too small to cut in half. - not interested in CoQ10, doesn't want to take anything OTC if he can avoid it  - myalgias are improved but not resolved. He feels he is getting used to the medication    Elevated PSA  - He has not scheduled an appointment with urology because he doesn't want to have a procedure. He has been taking tylenol and ibuprofen because of pain for neck and shoulder pain. He takes something 1-2x/week. Review of Systems   All other systems reviewed and are negative. PMHx    Patient Active Problem List   Diagnosis    Positive PPD    Elevated prostate specific antigen (PSA)    Anemia, unspecified    Essential hypertension, benign    Benign neoplasm of colon    Impotence of organic origin    Vitamin D deficiency    Thalassemia trait    Prediabetes    Mixed hyperlipidemia       Prior to Admission medications    Medication Sig Start Date End Date Taking?  Authorizing Provider   Cholecalciferol 50 MCG (2000 UT) TABS Take 1 tablet by mouth   Yes Historical Provider, MD   amLODIPine (NORVASC) 10

## 2023-07-03 NOTE — RESULT ENCOUNTER NOTE
Sent mail. Cholesterol well controlled, ok to decrease to rosuvastatin 5mg daily if still having myalgias.  CK normal.     Hermelinda Mott MD

## 2023-11-11 DIAGNOSIS — E78.2 MIXED HYPERLIPIDEMIA: ICD-10-CM

## 2023-11-12 RX ORDER — ROSUVASTATIN CALCIUM 5 MG/1
5 TABLET, COATED ORAL
Qty: 90 TABLET | Refills: 2 | Status: SHIPPED | OUTPATIENT
Start: 2023-11-12

## 2023-11-27 ENCOUNTER — OFFICE VISIT (OUTPATIENT)
Age: 63
End: 2023-11-27
Payer: COMMERCIAL

## 2023-11-27 VITALS
DIASTOLIC BLOOD PRESSURE: 79 MMHG | RESPIRATION RATE: 16 BRPM | WEIGHT: 200.8 LBS | SYSTOLIC BLOOD PRESSURE: 123 MMHG | TEMPERATURE: 97.8 F | HEART RATE: 69 BPM | HEIGHT: 69 IN | BODY MASS INDEX: 29.74 KG/M2 | OXYGEN SATURATION: 96 %

## 2023-11-27 DIAGNOSIS — Z12.5 PROSTATE CANCER SCREENING: ICD-10-CM

## 2023-11-27 DIAGNOSIS — R97.20 ELEVATED PROSTATE SPECIFIC ANTIGEN (PSA): ICD-10-CM

## 2023-11-27 DIAGNOSIS — E78.2 MIXED HYPERLIPIDEMIA: ICD-10-CM

## 2023-11-27 DIAGNOSIS — Z00.00 ROUTINE GENERAL MEDICAL EXAMINATION AT A HEALTH CARE FACILITY: Primary | ICD-10-CM

## 2023-11-27 DIAGNOSIS — K40.90 RIGHT INGUINAL HERNIA: ICD-10-CM

## 2023-11-27 DIAGNOSIS — I10 ESSENTIAL HYPERTENSION, BENIGN: ICD-10-CM

## 2023-11-27 DIAGNOSIS — Z00.00 ROUTINE GENERAL MEDICAL EXAMINATION AT A HEALTH CARE FACILITY: ICD-10-CM

## 2023-11-27 DIAGNOSIS — E55.9 VITAMIN D DEFICIENCY: ICD-10-CM

## 2023-11-27 LAB
ALBUMIN SERPL-MCNC: 3.9 G/DL (ref 3.5–5)
ALBUMIN/GLOB SERPL: 1.1 (ref 1.1–2.2)
ALP SERPL-CCNC: 90 U/L (ref 45–117)
ALT SERPL-CCNC: 20 U/L (ref 12–78)
ANION GAP SERPL CALC-SCNC: 2 MMOL/L (ref 5–15)
AST SERPL-CCNC: 14 U/L (ref 15–37)
BILIRUB SERPL-MCNC: 1 MG/DL (ref 0.2–1)
BUN SERPL-MCNC: 10 MG/DL (ref 6–20)
BUN/CREAT SERPL: 9 (ref 12–20)
CALCIUM SERPL-MCNC: 8.9 MG/DL (ref 8.5–10.1)
CHLORIDE SERPL-SCNC: 107 MMOL/L (ref 97–108)
CHOLEST SERPL-MCNC: 132 MG/DL
CO2 SERPL-SCNC: 30 MMOL/L (ref 21–32)
CREAT SERPL-MCNC: 1.06 MG/DL (ref 0.7–1.3)
ERYTHROCYTE [DISTWIDTH] IN BLOOD BY AUTOMATED COUNT: 20.7 % (ref 11.5–14.5)
GLOBULIN SER CALC-MCNC: 3.5 G/DL (ref 2–4)
GLUCOSE SERPL-MCNC: 93 MG/DL (ref 65–100)
HCT VFR BLD AUTO: 36.2 % (ref 36.6–50.3)
HDLC SERPL-MCNC: 49 MG/DL
HDLC SERPL: 2.7 (ref 0–5)
HGB BLD-MCNC: 10.6 G/DL (ref 12.1–17)
LDLC SERPL CALC-MCNC: 65.6 MG/DL (ref 0–100)
MCH RBC QN AUTO: 17.3 PG (ref 26–34)
MCHC RBC AUTO-ENTMCNC: 29.3 G/DL (ref 30–36.5)
MCV RBC AUTO: 59 FL (ref 80–99)
NRBC # BLD: 0 K/UL (ref 0–0.01)
NRBC BLD-RTO: 0 PER 100 WBC
PLATELET # BLD AUTO: 270 K/UL (ref 150–400)
POTASSIUM SERPL-SCNC: 4 MMOL/L (ref 3.5–5.1)
PROT SERPL-MCNC: 7.4 G/DL (ref 6.4–8.2)
PSA SERPL-MCNC: 11 NG/ML (ref 0.01–4)
RBC # BLD AUTO: 6.14 M/UL (ref 4.1–5.7)
SODIUM SERPL-SCNC: 139 MMOL/L (ref 136–145)
TRIGL SERPL-MCNC: 87 MG/DL
VLDLC SERPL CALC-MCNC: 17.4 MG/DL
WBC # BLD AUTO: 6.4 K/UL (ref 4.1–11.1)

## 2023-11-27 PROCEDURE — 99397 PER PM REEVAL EST PAT 65+ YR: CPT | Performed by: STUDENT IN AN ORGANIZED HEALTH CARE EDUCATION/TRAINING PROGRAM

## 2023-11-27 PROCEDURE — 3078F DIAST BP <80 MM HG: CPT | Performed by: STUDENT IN AN ORGANIZED HEALTH CARE EDUCATION/TRAINING PROGRAM

## 2023-11-27 PROCEDURE — 3074F SYST BP LT 130 MM HG: CPT | Performed by: STUDENT IN AN ORGANIZED HEALTH CARE EDUCATION/TRAINING PROGRAM

## 2023-11-27 NOTE — ASSESSMENT & PLAN NOTE
S/p high dose supplementation. I recommend he start D3 2000 units daily OTC.  He will  today from the pharmacist

## 2023-11-27 NOTE — PATIENT INSTRUCTIONS
Please take D3 2000 units daily     Please schedule an appointment with urology and let me know after you schedule which doctor and what office. Please schedule with Dr Georganna Leventhal for colonoscopy     Please obtain the following vaccines from your local pharmacy. Please ask your pharmacy to fax our office a copy of the vaccination record.  Our fax number is [phone].   - annual flu vaccine  - update COVID booster  - shingles vaccine  - pneumonia vaccine - prenvar 20

## 2023-11-28 NOTE — RESULT ENCOUNTER NOTE
Please call him  - he really really needs to see a urologist for evaluation. His PSA (prostate lab) continues to rise. I am very concerned he may have prostate cancer. Please have him call [facility] and schedule an appointment ASAP, then let us know who he will be seeing so we can send labs over  - blood counts, kidney function, liver function are stable. Cholesterol is well controlled on rosuvastatin.     Crystal Gonzalez MD

## 2023-12-13 DIAGNOSIS — I10 ESSENTIAL (PRIMARY) HYPERTENSION: ICD-10-CM

## 2023-12-13 RX ORDER — AMLODIPINE BESYLATE 10 MG/1
10 TABLET ORAL DAILY
Qty: 90 TABLET | Refills: 3 | Status: SHIPPED | OUTPATIENT
Start: 2023-12-13

## 2024-01-05 ENCOUNTER — TELEPHONE (OUTPATIENT)
Age: 64
End: 2024-01-05

## 2024-01-05 NOTE — TELEPHONE ENCOUNTER
Attempted to contact patient to reschedule appt with Dr. Regan on 1/10/24. Called home and mobile number. Left a voicemail on mobile number, could not leave a vm on home phone.

## 2024-01-22 ENCOUNTER — OFFICE VISIT (OUTPATIENT)
Age: 64
End: 2024-01-22
Payer: COMMERCIAL

## 2024-01-22 VITALS
OXYGEN SATURATION: 94 % | WEIGHT: 202.6 LBS | DIASTOLIC BLOOD PRESSURE: 65 MMHG | TEMPERATURE: 98.3 F | RESPIRATION RATE: 18 BRPM | HEART RATE: 83 BPM | BODY MASS INDEX: 30.01 KG/M2 | SYSTOLIC BLOOD PRESSURE: 130 MMHG | HEIGHT: 69 IN

## 2024-01-22 DIAGNOSIS — K40.90 RIGHT INGUINAL HERNIA: ICD-10-CM

## 2024-01-22 DIAGNOSIS — K42.9 UMBILICAL HERNIA WITHOUT OBSTRUCTION AND WITHOUT GANGRENE: Primary | ICD-10-CM

## 2024-01-22 PROCEDURE — 1123F ACP DISCUSS/DSCN MKR DOCD: CPT | Performed by: SURGERY

## 2024-01-22 PROCEDURE — 3078F DIAST BP <80 MM HG: CPT | Performed by: SURGERY

## 2024-01-22 PROCEDURE — 99203 OFFICE O/P NEW LOW 30 MIN: CPT | Performed by: SURGERY

## 2024-01-22 PROCEDURE — 3075F SYST BP GE 130 - 139MM HG: CPT | Performed by: SURGERY

## 2024-01-22 ASSESSMENT — PATIENT HEALTH QUESTIONNAIRE - PHQ9
SUM OF ALL RESPONSES TO PHQ QUESTIONS 1-9: 0
2. FEELING DOWN, DEPRESSED OR HOPELESS: 0
1. LITTLE INTEREST OR PLEASURE IN DOING THINGS: 0
SUM OF ALL RESPONSES TO PHQ QUESTIONS 1-9: 0
SUM OF ALL RESPONSES TO PHQ9 QUESTIONS 1 & 2: 0

## 2024-01-22 NOTE — PROGRESS NOTES
Identified pt with two pt identifiers (name and ). Reviewed chart in preparation for visit and have obtained necessary documentation.    Tristin Miller is a 63 y.o. male  Chief Complaint   Patient presents with    New Patient    Inguinal Hernia     /65 (Site: Right Upper Arm, Position: Sitting, Cuff Size: Large Adult)   Pulse 83   Temp 98.3 °F (36.8 °C) (Oral)   Resp 18   Ht 1.753 m (5' 9\")   Wt 91.9 kg (202 lb 9.6 oz)   SpO2 94%   BMI 29.92 kg/m²     1. Have you been to the ER, urgent care clinic since your last visit?  Hospitalized since your last visit?no    2. Have you seen or consulted any other health care providers outside of the [facility] since your last visit?  Include any pap smears or colon screening. no

## 2024-01-22 NOTE — PROGRESS NOTES
[facility] History and Physical    History of Present Illness:      Tristin Miller is a 63 y.o. male who has a right inguinal hernia.  He has had a right inguinal hernia for many years.  He is now starting to have some more pain with heavy lifting and straining at his work, he works for [employer].  He does notice the bulge is getting bigger.  The pain he has is a 1-2 out of 10.    Past Medical History:   Diagnosis Date    Hypertension     Thalassemia trait     Tobacco use disorder 2014       Past Surgical History:   Procedure Laterality Date    COLONOSCOPY      ,due 12    HEENT      cyst on forehead         Current Outpatient Medications:     amLODIPine (NORVASC) 10 MG tablet, TAKE 1 TABLET BY MOUTH EVERY DAY, Disp: 90 tablet, Rfl: 3    Cholecalciferol 50 MCG (2000) TABS, Take 1 tablet by mouth daily, Disp: , Rfl:     rosuvastatin (CRESTOR) 5 MG tablet, TAKE 1 TABLET BY MOUTH NIGHTLY (Patient not taking: Reported on 2024), Disp: 90 tablet, Rfl: 2    Allergies   Allergen Reactions    Lactose Diarrhea    Lisinopril Cough       Social History     Socioeconomic History    Marital status:      Spouse name: Not on file    Number of children: Not on file    Years of education: Not on file    Highest education level: Not on file   Occupational History    Not on file   Tobacco Use    Smoking status: Former     Current packs/day: 0.00     Average packs/day: 0.8 packs/day for 20.0 years (15.0 ttl pk-yrs)     Types: Cigarettes     Start date: 1995     Quit date: 2015     Years since quittin.0    Smokeless tobacco: Never   Substance and Sexual Activity    Alcohol use: Yes     Comment: 3-4 days/week 1 glass wine    Drug use: Never    Sexual activity: Not on file   Other Topics Concern    Not on file   Social History Narrative    Not on file     Social Determinants of Health     Financial Resource Strain: Low Risk  (7/3/2023)    Overall Financial

## 2024-03-04 ENCOUNTER — OFFICE VISIT (OUTPATIENT)
Age: 64
End: 2024-03-04
Payer: COMMERCIAL

## 2024-03-04 VITALS
HEART RATE: 87 BPM | OXYGEN SATURATION: 94 % | WEIGHT: 202.8 LBS | TEMPERATURE: 98.3 F | BODY MASS INDEX: 30.04 KG/M2 | DIASTOLIC BLOOD PRESSURE: 69 MMHG | HEIGHT: 69 IN | RESPIRATION RATE: 20 BRPM | SYSTOLIC BLOOD PRESSURE: 113 MMHG

## 2024-03-04 DIAGNOSIS — K42.9 UMBILICAL HERNIA WITHOUT OBSTRUCTION AND WITHOUT GANGRENE: Primary | ICD-10-CM

## 2024-03-04 DIAGNOSIS — K40.90 RIGHT INGUINAL HERNIA: ICD-10-CM

## 2024-03-04 PROCEDURE — 3078F DIAST BP <80 MM HG: CPT | Performed by: SURGERY

## 2024-03-04 PROCEDURE — 99214 OFFICE O/P EST MOD 30 MIN: CPT | Performed by: SURGERY

## 2024-03-04 PROCEDURE — 3074F SYST BP LT 130 MM HG: CPT | Performed by: SURGERY

## 2024-03-04 PROCEDURE — 1123F ACP DISCUSS/DSCN MKR DOCD: CPT | Performed by: SURGERY

## 2024-03-04 ASSESSMENT — PATIENT HEALTH QUESTIONNAIRE - PHQ9
SUM OF ALL RESPONSES TO PHQ QUESTIONS 1-9: 0
2. FEELING DOWN, DEPRESSED OR HOPELESS: 0
SUM OF ALL RESPONSES TO PHQ9 QUESTIONS 1 & 2: 0
SUM OF ALL RESPONSES TO PHQ QUESTIONS 1-9: 0
SUM OF ALL RESPONSES TO PHQ QUESTIONS 1-9: 0
1. LITTLE INTEREST OR PLEASURE IN DOING THINGS: 0
SUM OF ALL RESPONSES TO PHQ QUESTIONS 1-9: 0

## 2024-03-04 NOTE — PROGRESS NOTES
Identified patient with two patient identifiers (name and ). Reviewed chart in preparation for visit and have obtained necessary documentation.    Tristin Miller is a 63 y.o. male  Chief Complaint   Patient presents with    Follow-up     Umbilical hernia      /69 (Site: Right Upper Arm, Position: Sitting, Cuff Size: Medium Adult)   Pulse 87   Temp 98.3 °F (36.8 °C) (Oral)   Resp 20   Ht 1.753 m (5' 9\")   Wt 92 kg (202 lb 12.8 oz)   SpO2 94%   BMI 29.95 kg/m²     1. Have you been to the ER, urgent care clinic since your last visit?  Hospitalized since your last visit?no    2. Have you seen or consulted any other health care providers outside of the [facility] since your last visit?  Include any pap smears or colon screening. No    Patient and provider made aware of elevated BP x2. Patient asymptomatic. Patient reminded to monitor BP, continue to take BP medications if prescribed, and follow up with PCP/Cardiologist.  Patient expressed understanding and agreement.    
Medial Branch Radiofrequency Ablation     WHAT YOU NEED TO KNOW:   Medial branch radiofrequency ablation (RFA) is a procedure used to treat facet joint pain in your neck, mid back, or lower back  Facet joints are found at the back of each vertebra  A needle electrode is used to send electrical currents to the nerves in your facet joint  The electrical currents create heat that damages the nerve so it cannot send pain signals  ACTIVITY  · Do not drive or operate machinery today  · No strenuous activity today - bending, lifting, etc   · You may shower today, but do not sit in a tub of water  · Resume normal activities tomorrow as tolerated  CARE OF THE INJECTION SITE  · If you have soreness or pain, apply ice to the area today (20 minutes on/20 minutes off)  · Starting tomorrow, you may use warm, moist heat or ice if needed  · Notify the [facility] if you have any of the following: redness, drainage, swelling, or fever above 100°F     SPECIAL INSTRUCTIONS  · Our office will call you tomorrow for a progress report and make an appointment for a follow up visit in 4 weeks  · If you feel a sunburn-like sensation in the area of your procedure, call our office  MEDICATIONS  · Continue to take all routine medications  · Our office may have instructed you to hold some medications  If you have a problem specifically related to your procedure, please call our office at [phone]  Problems not related to your procedure should be directed to your primary care physician 
       General - alert and oriented, no apparent distress  HEENT - no jaundice, no hearing imparement  Pulm - CTAB, no C/W/R  CV - RRR, no M/R/G  Abd -soft, nondistended, bowel sounds present, nontender to palpation, umbilical hernia with defect about 2 cm in size soft and reducible and a right inguinal hernia that is small soft and reducible  Ext - pulses intact in UE and LE bilaterally, no edema  Skin - supple, no rashes  Psychiatric - normal affect, good mood    Labs  Lab Results   Component Value Date     11/27/2023    K 4.0 11/27/2023     11/27/2023    CO2 30 11/27/2023    BUN 10 11/27/2023    CREATININE 1.06 11/27/2023    GLUCOSE 93 11/27/2023    CALCIUM 8.9 11/27/2023    PROT 7.4 11/27/2023    LABALBU 3.9 11/27/2023    BILITOT 1.0 11/27/2023    ALKPHOS 90 11/27/2023    AST 14 (L) 11/27/2023    ALT 20 11/27/2023    LABGLOM >60 11/27/2023    GFRAA >60 11/03/2021    AGRATIO 1.1 11/27/2023    GLOB 3.5 11/27/2023       Lab Results   Component Value Date    WBC 6.4 11/27/2023    HGB 10.6 (L) 11/27/2023    HCT 36.2 (L) 11/27/2023    MCV 59.0 (L) 11/27/2023     11/27/2023         Imaging  None  I have reviewed and agree with all of the pertinent images    Assessment:     Tristin Miller is a 63 y.o. male with right inguinal hernia and umbilical hernia    Recommendations:     1.   The patient does want to move forward with right inguinal hernia repair.  I will schedule him for robotic right inguinal hernia repair with mesh in the operating room.  The umbilical hernia he is not sure if he wants to have that fixed.  Since it is a 2 cm plus umbilical hernia he would need robotic repair with mesh.  This would require a separate set up with lateral incisions and would need to undocked after doing the inguinal hernia repair.  He is unsure if he wants to have the umbilical hernia repair done.  He will let us know when we call him to schedule surgery what he would like to do.  If I do the umbilical and

## 2024-03-06 ENCOUNTER — TELEPHONE (OUTPATIENT)
Age: 64
End: 2024-03-06

## 2024-03-06 NOTE — TELEPHONE ENCOUNTER
Contacted patient regarding scheduling surgery with Dr. Regan. Patient was offered 3/22, 4/9, 4/12. Patient and his spouse stated that they need to look over the calendar and will call back with a date that works for them.

## 2024-03-07 ENCOUNTER — TELEPHONE (OUTPATIENT)
Age: 64
End: 2024-03-07

## 2024-03-07 ENCOUNTER — PREP FOR PROCEDURE (OUTPATIENT)
Age: 64
End: 2024-03-07

## 2024-03-07 DIAGNOSIS — K40.90 RIGHT INGUINAL HERNIA: ICD-10-CM

## 2024-04-09 ENCOUNTER — ANESTHESIA EVENT (OUTPATIENT)
Facility: HOSPITAL | Age: 64
End: 2024-04-09
Payer: COMMERCIAL

## 2024-04-09 ENCOUNTER — HOSPITAL ENCOUNTER (OUTPATIENT)
Facility: HOSPITAL | Age: 64
Setting detail: OUTPATIENT SURGERY
Discharge: HOME | End: 2024-04-09
Attending: SURGERY | Admitting: SURGERY
Payer: COMMERCIAL

## 2024-04-09 ENCOUNTER — ANESTHESIA (OUTPATIENT)
Facility: HOSPITAL | Age: 64
End: 2024-04-09
Payer: COMMERCIAL

## 2024-04-09 VITALS
RESPIRATION RATE: 14 BRPM | TEMPERATURE: 98.1 F | OXYGEN SATURATION: 97 % | HEIGHT: 69 IN | HEART RATE: 71 BPM | WEIGHT: 204 LBS | BODY MASS INDEX: 30.21 KG/M2 | SYSTOLIC BLOOD PRESSURE: 122 MMHG | DIASTOLIC BLOOD PRESSURE: 70 MMHG

## 2024-04-09 DIAGNOSIS — K40.90 RIGHT INGUINAL HERNIA: Primary | ICD-10-CM

## 2024-04-09 PROCEDURE — 49650 LAP ING HERNIA REPAIR INIT: CPT | Performed by: SURGERY

## 2024-04-09 PROCEDURE — 3700000000 HC ANESTHESIA ATTENDED CARE: Performed by: SURGERY

## 2024-04-09 PROCEDURE — 2500000003 HC RX 250 WO HCPCS: Performed by: NURSE ANESTHETIST, CERTIFIED REGISTERED

## 2024-04-09 PROCEDURE — 6360000002 HC RX W HCPCS: Performed by: SURGERY

## 2024-04-09 PROCEDURE — 7100000001 HC PACU RECOVERY - ADDTL 15 MIN: Performed by: SURGERY

## 2024-04-09 PROCEDURE — 3600000019 HC SURGERY ROBOT ADDTL 15MIN: Performed by: SURGERY

## 2024-04-09 PROCEDURE — 6360000002 HC RX W HCPCS: Performed by: ANESTHESIOLOGY

## 2024-04-09 PROCEDURE — 7100000000 HC PACU RECOVERY - FIRST 15 MIN: Performed by: SURGERY

## 2024-04-09 PROCEDURE — 3700000001 HC ADD 15 MINUTES (ANESTHESIA): Performed by: SURGERY

## 2024-04-09 PROCEDURE — 2580000003 HC RX 258: Performed by: NURSE ANESTHETIST, CERTIFIED REGISTERED

## 2024-04-09 PROCEDURE — 6370000000 HC RX 637 (ALT 250 FOR IP): Performed by: ANESTHESIOLOGY

## 2024-04-09 PROCEDURE — 2709999900 HC NON-CHARGEABLE SUPPLY: Performed by: SURGERY

## 2024-04-09 PROCEDURE — S2900 ROBOTIC SURGICAL SYSTEM: HCPCS | Performed by: SURGERY

## 2024-04-09 PROCEDURE — 6360000002 HC RX W HCPCS: Performed by: NURSE ANESTHETIST, CERTIFIED REGISTERED

## 2024-04-09 PROCEDURE — 2580000003 HC RX 258: Performed by: SURGERY

## 2024-04-09 PROCEDURE — 49650 LAP ING HERNIA REPAIR INIT: CPT | Performed by: PHYSICIAN ASSISTANT

## 2024-04-09 PROCEDURE — 3600000009 HC SURGERY ROBOT BASE: Performed by: SURGERY

## 2024-04-09 PROCEDURE — C1781 MESH (IMPLANTABLE): HCPCS | Performed by: SURGERY

## 2024-04-09 PROCEDURE — 2720000010 HC SURG SUPPLY STERILE: Performed by: SURGERY

## 2024-04-09 PROCEDURE — 2580000003 HC RX 258: Performed by: ANESTHESIOLOGY

## 2024-04-09 DEVICE — MESH CS RIGHT LARGE 10CM X 16CM: Type: IMPLANTABLE DEVICE | Site: GROIN | Status: FUNCTIONAL

## 2024-04-09 RX ORDER — OXYCODONE HYDROCHLORIDE 5 MG/1
5 TABLET ORAL
Status: DISCONTINUED | OUTPATIENT
Start: 2024-04-09 | End: 2024-04-09 | Stop reason: HOSPADM

## 2024-04-09 RX ORDER — ACETAMINOPHEN 500 MG
1000 TABLET ORAL ONCE
Status: COMPLETED | OUTPATIENT
Start: 2024-04-09 | End: 2024-04-09

## 2024-04-09 RX ORDER — OXYCODONE HYDROCHLORIDE AND ACETAMINOPHEN 5; 325 MG/1; MG/1
1 TABLET ORAL EVERY 6 HOURS PRN
Qty: 20 TABLET | Refills: 0 | Status: SHIPPED | OUTPATIENT
Start: 2024-04-09 | End: 2024-04-16

## 2024-04-09 RX ORDER — KETAMINE HCL IN NACL, ISO-OSM 100MG/10ML
SYRINGE (ML) INJECTION PRN
Status: DISCONTINUED | OUTPATIENT
Start: 2024-04-09 | End: 2024-04-09 | Stop reason: SDUPTHER

## 2024-04-09 RX ORDER — IBUPROFEN 600 MG/1
600 TABLET ORAL 3 TIMES DAILY PRN
Qty: 30 TABLET | Refills: 0 | Status: SHIPPED | OUTPATIENT
Start: 2024-04-09

## 2024-04-09 RX ORDER — DEXAMETHASONE SODIUM PHOSPHATE 4 MG/ML
INJECTION, SOLUTION INTRA-ARTICULAR; INTRALESIONAL; INTRAMUSCULAR; INTRAVENOUS; SOFT TISSUE PRN
Status: DISCONTINUED | OUTPATIENT
Start: 2024-04-09 | End: 2024-04-09 | Stop reason: SDUPTHER

## 2024-04-09 RX ORDER — HYDROMORPHONE HYDROCHLORIDE 1 MG/ML
0.5 INJECTION, SOLUTION INTRAMUSCULAR; INTRAVENOUS; SUBCUTANEOUS EVERY 5 MIN PRN
Status: COMPLETED | OUTPATIENT
Start: 2024-04-09 | End: 2024-04-09

## 2024-04-09 RX ORDER — ROCURONIUM BROMIDE 10 MG/ML
INJECTION, SOLUTION INTRAVENOUS PRN
Status: DISCONTINUED | OUTPATIENT
Start: 2024-04-09 | End: 2024-04-09 | Stop reason: SDUPTHER

## 2024-04-09 RX ORDER — MIDAZOLAM HYDROCHLORIDE 1 MG/ML
INJECTION INTRAMUSCULAR; INTRAVENOUS PRN
Status: DISCONTINUED | OUTPATIENT
Start: 2024-04-09 | End: 2024-04-09 | Stop reason: SDUPTHER

## 2024-04-09 RX ORDER — HYDRALAZINE HYDROCHLORIDE 20 MG/ML
10 INJECTION INTRAMUSCULAR; INTRAVENOUS
Status: DISCONTINUED | OUTPATIENT
Start: 2024-04-09 | End: 2024-04-09 | Stop reason: HOSPADM

## 2024-04-09 RX ORDER — LIDOCAINE HYDROCHLORIDE 10 MG/ML
1 INJECTION, SOLUTION EPIDURAL; INFILTRATION; INTRACAUDAL; PERINEURAL
Status: DISCONTINUED | OUTPATIENT
Start: 2024-04-09 | End: 2024-04-09 | Stop reason: HOSPADM

## 2024-04-09 RX ORDER — FENTANYL CITRATE 50 UG/ML
INJECTION, SOLUTION INTRAMUSCULAR; INTRAVENOUS PRN
Status: DISCONTINUED | OUTPATIENT
Start: 2024-04-09 | End: 2024-04-09 | Stop reason: SDUPTHER

## 2024-04-09 RX ORDER — PHENYLEPHRINE HYDROCHLORIDE 10 MG/ML
INJECTION INTRAVENOUS PRN
Status: DISCONTINUED | OUTPATIENT
Start: 2024-04-09 | End: 2024-04-09

## 2024-04-09 RX ORDER — SODIUM CHLORIDE 0.9 % (FLUSH) 0.9 %
5-40 SYRINGE (ML) INJECTION PRN
Status: DISCONTINUED | OUTPATIENT
Start: 2024-04-09 | End: 2024-04-09 | Stop reason: HOSPADM

## 2024-04-09 RX ORDER — PHENYLEPHRINE HCL IN 0.9% NACL 0.4MG/10ML
SYRINGE (ML) INTRAVENOUS PRN
Status: DISCONTINUED | OUTPATIENT
Start: 2024-04-09 | End: 2024-04-09 | Stop reason: SDUPTHER

## 2024-04-09 RX ORDER — NALOXONE HYDROCHLORIDE 0.4 MG/ML
INJECTION, SOLUTION INTRAMUSCULAR; INTRAVENOUS; SUBCUTANEOUS PRN
Status: DISCONTINUED | OUTPATIENT
Start: 2024-04-09 | End: 2024-04-09 | Stop reason: HOSPADM

## 2024-04-09 RX ORDER — SODIUM CHLORIDE 9 MG/ML
INJECTION, SOLUTION INTRAVENOUS PRN
Status: DISCONTINUED | OUTPATIENT
Start: 2024-04-09 | End: 2024-04-09 | Stop reason: HOSPADM

## 2024-04-09 RX ORDER — SUCCINYLCHOLINE/SOD CL,ISO/PF 200MG/10ML
SYRINGE (ML) INTRAVENOUS PRN
Status: DISCONTINUED | OUTPATIENT
Start: 2024-04-09 | End: 2024-04-09 | Stop reason: SDUPTHER

## 2024-04-09 RX ORDER — ONDANSETRON 2 MG/ML
4 INJECTION INTRAMUSCULAR; INTRAVENOUS
Status: DISCONTINUED | OUTPATIENT
Start: 2024-04-09 | End: 2024-04-09 | Stop reason: HOSPADM

## 2024-04-09 RX ORDER — FENTANYL CITRATE 50 UG/ML
25 INJECTION, SOLUTION INTRAMUSCULAR; INTRAVENOUS EVERY 5 MIN PRN
Status: DISCONTINUED | OUTPATIENT
Start: 2024-04-09 | End: 2024-04-09 | Stop reason: HOSPADM

## 2024-04-09 RX ORDER — SODIUM CHLORIDE 0.9 % (FLUSH) 0.9 %
5-40 SYRINGE (ML) INJECTION EVERY 12 HOURS SCHEDULED
Status: DISCONTINUED | OUTPATIENT
Start: 2024-04-09 | End: 2024-04-09 | Stop reason: HOSPADM

## 2024-04-09 RX ORDER — PROCHLORPERAZINE EDISYLATE 5 MG/ML
5 INJECTION INTRAMUSCULAR; INTRAVENOUS
Status: DISCONTINUED | OUTPATIENT
Start: 2024-04-09 | End: 2024-04-09 | Stop reason: HOSPADM

## 2024-04-09 RX ORDER — SODIUM CHLORIDE, SODIUM LACTATE, POTASSIUM CHLORIDE, CALCIUM CHLORIDE 600; 310; 30; 20 MG/100ML; MG/100ML; MG/100ML; MG/100ML
INJECTION, SOLUTION INTRAVENOUS CONTINUOUS PRN
Status: DISCONTINUED | OUTPATIENT
Start: 2024-04-09 | End: 2024-04-09 | Stop reason: SDUPTHER

## 2024-04-09 RX ORDER — MIDAZOLAM HYDROCHLORIDE 2 MG/2ML
2 INJECTION, SOLUTION INTRAMUSCULAR; INTRAVENOUS
Status: DISCONTINUED | OUTPATIENT
Start: 2024-04-09 | End: 2024-04-09 | Stop reason: HOSPADM

## 2024-04-09 RX ORDER — SODIUM CHLORIDE, SODIUM LACTATE, POTASSIUM CHLORIDE, CALCIUM CHLORIDE 600; 310; 30; 20 MG/100ML; MG/100ML; MG/100ML; MG/100ML
INJECTION, SOLUTION INTRAVENOUS CONTINUOUS
Status: DISCONTINUED | OUTPATIENT
Start: 2024-04-09 | End: 2024-04-09 | Stop reason: HOSPADM

## 2024-04-09 RX ORDER — LIDOCAINE HYDROCHLORIDE 20 MG/ML
INJECTION, SOLUTION EPIDURAL; INFILTRATION; INTRACAUDAL; PERINEURAL PRN
Status: DISCONTINUED | OUTPATIENT
Start: 2024-04-09 | End: 2024-04-09 | Stop reason: SDUPTHER

## 2024-04-09 RX ORDER — FENTANYL CITRATE 50 UG/ML
100 INJECTION, SOLUTION INTRAMUSCULAR; INTRAVENOUS
Status: DISCONTINUED | OUTPATIENT
Start: 2024-04-09 | End: 2024-04-09 | Stop reason: HOSPADM

## 2024-04-09 RX ORDER — ONDANSETRON 2 MG/ML
INJECTION INTRAMUSCULAR; INTRAVENOUS PRN
Status: DISCONTINUED | OUTPATIENT
Start: 2024-04-09 | End: 2024-04-09 | Stop reason: SDUPTHER

## 2024-04-09 RX ORDER — PROPOFOL 10 MG/ML
INJECTION, EMULSION INTRAVENOUS PRN
Status: DISCONTINUED | OUTPATIENT
Start: 2024-04-09 | End: 2024-04-09 | Stop reason: SDUPTHER

## 2024-04-09 RX ADMIN — HYDROMORPHONE HYDROCHLORIDE 0.5 MG: 1 INJECTION, SOLUTION INTRAMUSCULAR; INTRAVENOUS; SUBCUTANEOUS at 13:40

## 2024-04-09 RX ADMIN — FENTANYL CITRATE 50 MCG: 50 INJECTION INTRAMUSCULAR; INTRAVENOUS at 14:37

## 2024-04-09 RX ADMIN — Medication 80 MCG: at 12:18

## 2024-04-09 RX ADMIN — LIDOCAINE HYDROCHLORIDE 100 MG: 20 INJECTION, SOLUTION EPIDURAL; INFILTRATION; INTRACAUDAL; PERINEURAL at 11:36

## 2024-04-09 RX ADMIN — Medication 80 MCG: at 11:45

## 2024-04-09 RX ADMIN — SODIUM CHLORIDE, POTASSIUM CHLORIDE, SODIUM LACTATE AND CALCIUM CHLORIDE: 600; 310; 30; 20 INJECTION, SOLUTION INTRAVENOUS at 11:31

## 2024-04-09 RX ADMIN — FENTANYL CITRATE 50 MCG: 50 INJECTION, SOLUTION INTRAMUSCULAR; INTRAVENOUS at 11:48

## 2024-04-09 RX ADMIN — SUGAMMADEX 200 MG: 100 INJECTION, SOLUTION INTRAVENOUS at 13:14

## 2024-04-09 RX ADMIN — Medication 80 MCG: at 12:38

## 2024-04-09 RX ADMIN — Medication 25 MG: at 11:36

## 2024-04-09 RX ADMIN — ACETAMINOPHEN 1000 MG: 500 TABLET ORAL at 10:37

## 2024-04-09 RX ADMIN — ONDANSETRON 4 MG: 2 INJECTION INTRAMUSCULAR; INTRAVENOUS at 11:49

## 2024-04-09 RX ADMIN — Medication 80 MCG: at 11:55

## 2024-04-09 RX ADMIN — MIDAZOLAM 2 MG: 1 INJECTION INTRAMUSCULAR; INTRAVENOUS at 11:27

## 2024-04-09 RX ADMIN — PROPOFOL 150 MG: 10 INJECTION, EMULSION INTRAVENOUS at 11:36

## 2024-04-09 RX ADMIN — DEXAMETHASONE SODIUM PHOSPHATE 4 MG: 4 INJECTION, SOLUTION INTRAMUSCULAR; INTRAVENOUS at 11:49

## 2024-04-09 RX ADMIN — HYDROMORPHONE HYDROCHLORIDE 0.25 MG: 1 INJECTION, SOLUTION INTRAMUSCULAR; INTRAVENOUS; SUBCUTANEOUS at 12:53

## 2024-04-09 RX ADMIN — ROCURONIUM BROMIDE 30 MG: 10 INJECTION, SOLUTION INTRAVENOUS at 11:40

## 2024-04-09 RX ADMIN — WATER 2000 MG: 1 INJECTION INTRAMUSCULAR; INTRAVENOUS; SUBCUTANEOUS at 11:49

## 2024-04-09 RX ADMIN — ROCURONIUM BROMIDE 10 MG: 10 INJECTION, SOLUTION INTRAVENOUS at 12:37

## 2024-04-09 RX ADMIN — FENTANYL CITRATE 25 MCG: 50 INJECTION INTRAMUSCULAR; INTRAVENOUS at 14:01

## 2024-04-09 RX ADMIN — Medication 80 MCG: at 12:13

## 2024-04-09 RX ADMIN — HYDROMORPHONE HYDROCHLORIDE 0.5 MG: 1 INJECTION, SOLUTION INTRAMUSCULAR; INTRAVENOUS; SUBCUTANEOUS at 14:50

## 2024-04-09 RX ADMIN — Medication 80 MCG: at 12:35

## 2024-04-09 RX ADMIN — PHENYLEPHRINE HYDROCHLORIDE 25 MCG/MIN: 10 INJECTION INTRAVENOUS at 12:49

## 2024-04-09 RX ADMIN — HYDROMORPHONE HYDROCHLORIDE 0.5 MG: 1 INJECTION, SOLUTION INTRAMUSCULAR; INTRAVENOUS; SUBCUTANEOUS at 14:32

## 2024-04-09 RX ADMIN — HYDROMORPHONE HYDROCHLORIDE 0.25 MG: 1 INJECTION, SOLUTION INTRAMUSCULAR; INTRAVENOUS; SUBCUTANEOUS at 13:25

## 2024-04-09 RX ADMIN — Medication 160 MG: at 11:36

## 2024-04-09 RX ADMIN — SODIUM CHLORIDE, POTASSIUM CHLORIDE, SODIUM LACTATE AND CALCIUM CHLORIDE: 600; 310; 30; 20 INJECTION, SOLUTION INTRAVENOUS at 10:42

## 2024-04-09 RX ADMIN — FENTANYL CITRATE 50 MCG: 50 INJECTION, SOLUTION INTRAMUSCULAR; INTRAVENOUS at 11:36

## 2024-04-09 RX ADMIN — ROCURONIUM BROMIDE 10 MG: 10 INJECTION, SOLUTION INTRAVENOUS at 11:36

## 2024-04-09 ASSESSMENT — PAIN SCALES - GENERAL
PAINLEVEL_OUTOF10: 5
PAINLEVEL_OUTOF10: 5
PAINLEVEL_OUTOF10: 6
PAINLEVEL_OUTOF10: 3
PAINLEVEL_OUTOF10: 6

## 2024-04-09 ASSESSMENT — PAIN DESCRIPTION - FREQUENCY: FREQUENCY: INTERMITTENT

## 2024-04-09 ASSESSMENT — PAIN DESCRIPTION - ONSET: ONSET: ON-GOING

## 2024-04-09 ASSESSMENT — PAIN - FUNCTIONAL ASSESSMENT
PAIN_FUNCTIONAL_ASSESSMENT: 0-10
PAIN_FUNCTIONAL_ASSESSMENT: ACTIVITIES ARE NOT PREVENTED

## 2024-04-09 ASSESSMENT — PAIN DESCRIPTION - LOCATION
LOCATION: ABDOMEN

## 2024-04-09 ASSESSMENT — PAIN DESCRIPTION - PAIN TYPE: TYPE: SURGICAL PAIN

## 2024-04-09 ASSESSMENT — PAIN DESCRIPTION - ORIENTATION
ORIENTATION: ANTERIOR
ORIENTATION: ANTERIOR

## 2024-04-09 ASSESSMENT — PAIN DESCRIPTION - DESCRIPTORS
DESCRIPTORS: ACHING
DESCRIPTORS: ACHING

## 2024-04-09 NOTE — ANESTHESIA POSTPROCEDURE EVALUATION
Department of Anesthesiology  Postprocedure Note    Patient: Tristin Miller  MRN: [id number]  YOB: 1960  Date of evaluation: 4/9/2024    Procedure Summary       Date: 04/09/24 Room / Location: [facility] MAIN OR 09 / [facility] MAIN OR    Anesthesia Start: 1131 Anesthesia Stop: 1341    Procedure: ROBOTIC RIGHT INGUINAL HERNIA REPAIR WITH MESH (Right: Abdomen) Diagnosis:       Right inguinal hernia      (Right inguinal hernia [K40.90])    Providers: Ronald Regan MD Responsible Provider: Omar Davis MD    Anesthesia Type: general ASA Status: 2            Anesthesia Type: No value filed.    Madison Phase I: Madison Score: 10    Madison Phase II:      Anesthesia Post Evaluation    Patient location during evaluation: PACU  Patient participation: complete - patient participated  Level of consciousness: awake  Airway patency: patent  Nausea & Vomiting: no nausea  Cardiovascular status: blood pressure returned to baseline and hemodynamically stable  Respiratory status: acceptable  Hydration status: stable  Multimodal analgesia pain management approach    No notable events documented.

## 2024-04-09 NOTE — DISCHARGE INSTRUCTIONS
[facility]   Post Operative Instructions      Please call your surgeons  office for a 2 week follow-up appointment with Dr Regan .  Office address is:    [facility] [place] [address]  [phone]      Discharge Instructions:    You may resume your usual diet as well as your usual medications.      You are not cleared to drive if you are taking narcotic pain medication.  If you are only using tylenol for pain and are comfortable sitting in a car, you may drive.    No heavy lifting.  No strenuous exercise for 2 weeks.  You are cleared to go up and down stairs.  You may shower but no baths or swimming.      Make sure you are able to urinate within 8-10 hours after you surgery.  If not then you will need to go to the ER to have the bladder decompressed or you can call our office on call surgeon.    Your incisions dont need any special care.  You can wash them gently with  warm soap and water daily and pat them dry.  Your stitches are under the skin and dont need to be removed.    Ask you doctor when you can return to work.      Call our office at  [phone] to make a 2 week follow up appointment.  Call our office with any problems, questions or concerns.  Call our office if you have any     Fevers of 101 or higher   Worsening pain  Nausea/Vomiting  Difficulty eating or drinking  Incisions that are red, open, draining or tender.          Inguinal Hernia Repair Surgery: What to Expect at Home  Your Recovery     After surgery to repair a hernia, you're likely to have pain for a few days. You may also feel tired and have less energy than normal. This is common.  You should start to feel better after a few days. And you'll probably feel much better in 7 days. For a few weeks you may feel discomfort or pulling in the groin area when you move. You may have some bruising near the repair site and on your

## 2024-04-09 NOTE — PERIOP NOTE
Miguel added to sterile field for application by MD.  Lot # [id number]. Exp. Date 09/28/2028.    
other pertinent information regarding your care.    Social distancing practices are strongly encouraged in waiting areas and the cafeteria.       The patient was contacted via telephone.   He verbalized understanding of all instructions does not need reinforcement.

## 2024-04-09 NOTE — ANESTHESIA PRE PROCEDURE
found for: \"PHART\", \"PO2ART\", \"ICN9TRP\", \"LNE0BSI\", \"BEART\", \"G4TKOSAW\"     Type & Screen (If Applicable):  No results found for: \"LABABO\", \"LABRH\"    Drug/Infectious Status (If Applicable):  Lab Results   Component Value Date/Time    HEPCAB <0.1 03/22/2017 04:05 PM       COVID-19 Screening (If Applicable): No results found for: \"COVID19\"        Anesthesia Evaluation  Patient summary reviewed and Nursing notes reviewed  Airway: Mallampati: II  TM distance: >3 FB   Neck ROM: full  Mouth opening: > = 3 FB   Dental: normal exam         Pulmonary:Negative Pulmonary ROS breath sounds clear to auscultation                             Cardiovascular:  Exercise tolerance: good (>4 METS)  (+) hypertension:, hyperlipidemia        Rhythm: regular  Rate: normal                    Neuro/Psych:   Negative Neuro/Psych ROS              GI/Hepatic/Renal: Neg GI/Hepatic/Renal ROS            Endo/Other: Negative Endo/Other ROS                    Abdominal:             Vascular: negative vascular ROS.         Other Findings:         Anesthesia Plan      general     ASA 2       Induction: intravenous.    MIPS: Postoperative opioids intended and Prophylactic antiemetics administered.  Anesthetic plan and risks discussed with patient.    Use of blood products discussed with patient whom consented to blood products.    Plan discussed with CRNA.                Omar Davis MD   4/9/2024

## 2024-04-09 NOTE — H&P
[facility] History and Physical     History of Present Illness:      Tristin Miller is a 63 y.o. male who has a right inguinal hernia and an umbilical hernia.  I saw him a few weeks ago about this.  He says he did not get a call to schedule surgery.  He is still having some pain in the right groin.  He also does have an umbilical hernia that is small and does not seem to bother him.  At first when I saw him he did not seem to want to have repair of the umbilical hernia since it was not giving him any pain.  He does want to have her hernia repair on the right side.     Past Medical History        Past Medical History:   Diagnosis Date    Hypertension      Thalassemia trait      Tobacco use disorder 2014            Past Surgical History         Past Surgical History:   Procedure Laterality Date    COLONOSCOPY         , 12    HEENT         cyst on forehead              Current Medication      Current Outpatient Medications:     amLODIPine (NORVASC) 10 MG tablet, TAKE 1 TABLET BY MOUTH EVERY DAY, Disp: 90 tablet, Rfl: 3    rosuvastatin (CRESTOR) 5 MG tablet, TAKE 1 TABLET BY MOUTH NIGHTLY, Disp: 90 tablet, Rfl: 2    Cholecalciferol 50 MCG (2000) TABS, Take 1 tablet by mouth daily (Patient not taking: Reported on 3/4/2024), Disp: , Rfl:              Allergies   Allergen Reactions    Lactose Diarrhea    Lisinopril Cough         Social History               Socioeconomic History    Marital status:        Spouse name: Not on file    Number of children: Not on file    Years of education: Not on file    Highest education level: Not on file   Occupational History    Not on file   Tobacco Use    Smoking status: Former       Current packs/day: 0.00       Average packs/day: 0.8 packs/day for 20.0 years (15.0 ttl pk-yrs)       Types: Cigarettes       Start date: 1995       Quit date: 2015       Years since quittin.1    Smokeless tobacco: Never

## 2024-04-09 NOTE — BRIEF OP NOTE
Brief Postoperative Note      Patient: Tristin Miller  YOB: 1960  MRN: [id number]    Date of Procedure: 4/9/2024    Pre-Op Diagnosis Codes:     * Right inguinal hernia [K40.90]    Post-Op Diagnosis: Same       Procedure(s):  ROBOTIC RIGHT INGUINAL HERNIA REPAIR WITH MESH    Surgeon(s):  Ronald Regan MD    Assistant:  Physician Assistant: Jazmin Ayala PA    Anesthesia: General    Estimated Blood Loss (mL): Minimal    Complications: None    Specimens:   * No specimens in log *    Implants:  Implant Name Type Inv. Item Serial No.  Lot No. LRB No. Used Action   MESH CS RIGHT LARGE 10CM X 16CM - SN/A Mesh MESH CS RIGHT LARGE 10CM X 16CM N/A [employer] [id number] Right 1 Implanted         Drains: * No LDAs found *    Findings:  Infection Present At Time Of Surgery (PATOS) (choose all levels that have infection present):  No infection present  Other Findings: small right indirect inguinal hernia repaired with [id number] Bard 3D max mid mesh placed preperitoneal, large amount of preperitoneal fat and cord lipoma    Electronically signed by Ronald Regan MD on 4/9/2024 at 1:16 PM

## 2024-04-10 NOTE — OP NOTE
[address]  [address]                            OPERATIVE REPORT      PATIENT NAME: JOSUÉ KLEIN              : 1960  MED REC NO: [id number]                       ROOM: OR  ACCOUNT NO: [id number]                       ADMIT DATE: 2024  PROVIDER: Ronald Regan MD    DATE OF SERVICE:  2024    PREOPERATIVE DIAGNOSES:  Right inguinal hernia.    POSTOPERATIVE DIAGNOSES:  Right inguinal hernia.    PROCEDURES PERFORMED:  Robotic right inguinal hernia repair with mesh.    SURGEON:  Ronald Regan MD    ASSISTANT:  Physician assistant, ZAHEER Ren.    ANESTHESIA:  General.    ESTIMATED BLOOD LOSS:  Minimal.    SPECIMENS REMOVED:  None.    INTRAOPERATIVE FINDINGS:  No infection present at the time of surgery.  A small right indirect inguinal hernia was repaired with a 16 x 10 cm Bard 3DMax Mid mesh placed preperitoneal.  There was a large amount of preperitoneal fat and cord lipoma reduced.     COMPLICATIONS:  None.    IMPLANTS:  Right-sided large 16 x 10 cm Bard 3DMax Mid mesh.    INDICATIONS:  The patient is a 63-year-old male, who has a symptomatic right inguinal hernia that is needing repair with mesh in the operating room.  My assisting PA, Jazmin Ayala was necessary for the operation due to the complex nature of the robotic operation.  She was necessary for operation of the camera, retraction, and operation of the robot while I was on the console, and there were no skilled assistants available for the operation.    DESCRIPTION OF PROCEDURE:  The patient was met in the preoperative holding area.  H and P was updated.  Consent was signed.  All risks and benefits were explained to the patient prior to the start of the operation.  He was taken back to the operating room.  He was lying in the supine position.  The abdomen was prepped and draped in standard sterile fashion.  Time-out was called.  Antibiotics were given.

## 2024-04-11 ENCOUNTER — TELEPHONE (OUTPATIENT)
Age: 64
End: 2024-04-11

## 2024-04-11 NOTE — TELEPHONE ENCOUNTER
Patients wife came into the office and dropped off forms for patient to be completed. Placed in providers box.

## 2024-04-23 ENCOUNTER — OFFICE VISIT (OUTPATIENT)
Age: 64
End: 2024-04-23

## 2024-04-23 VITALS
DIASTOLIC BLOOD PRESSURE: 78 MMHG | WEIGHT: 205.8 LBS | OXYGEN SATURATION: 95 % | BODY MASS INDEX: 30.48 KG/M2 | HEART RATE: 71 BPM | RESPIRATION RATE: 17 BRPM | HEIGHT: 69 IN | TEMPERATURE: 97.9 F | SYSTOLIC BLOOD PRESSURE: 140 MMHG

## 2024-04-23 DIAGNOSIS — Z09 POSTOP CHECK: ICD-10-CM

## 2024-04-23 DIAGNOSIS — K40.90 RIGHT INGUINAL HERNIA: Primary | ICD-10-CM

## 2024-04-23 PROCEDURE — 99024 POSTOP FOLLOW-UP VISIT: CPT | Performed by: NURSE PRACTITIONER

## 2024-04-23 RX ORDER — IBUPROFEN 600 MG/1
600 TABLET ORAL 3 TIMES DAILY PRN
Qty: 30 TABLET | Refills: 0 | Status: SHIPPED | OUTPATIENT
Start: 2024-04-23

## 2024-04-23 ASSESSMENT — PATIENT HEALTH QUESTIONNAIRE - PHQ9
SUM OF ALL RESPONSES TO PHQ QUESTIONS 1-9: 0
2. FEELING DOWN, DEPRESSED OR HOPELESS: NOT AT ALL
SUM OF ALL RESPONSES TO PHQ9 QUESTIONS 1 & 2: 0
1. LITTLE INTEREST OR PLEASURE IN DOING THINGS: NOT AT ALL

## 2024-04-23 NOTE — PROGRESS NOTES
Identified pt with two pt identifiers (name and ). Reviewed chart in preparation for visit and have obtained necessary documentation.    Tristin Miller is a 63 y.o. male  Chief Complaint   Patient presents with    Post-Op Check     2 week post ROBOTIC RIGHT INGUINAL HERNIA REPAIR WITH MESH     BP (!) 143/77 (Site: Right Upper Arm, Position: Sitting, Cuff Size: Large Adult)   Pulse 71   Temp 97.9 °F (36.6 °C) (Oral)   Resp 17   Ht 1.753 m (5' 9\")   Wt 93.4 kg (205 lb 12.8 oz)   SpO2 95%   BMI 30.39 kg/m²     1. Have you been to the ER, urgent care clinic since your last visit?  Hospitalized since your last visit?no    2. Have you seen or consulted any other health care providers outside of the [facility] since your last visit?  Include any pap smears or colon screening. No    Patient and provider made aware of elevated BP x2. Patient asymptomatic. Patient reminded to monitor BP, continue to take BP medications if prescribed, and follow up with PCP/Cardiologist.  Patient expressed understanding and agreement.

## 2024-04-23 NOTE — PROGRESS NOTES
Subjective:      Tristin Miller is a 63 y.o. male presents for postop care 2 weeks status post robotic right inguinal hernia repair with mesh  Appetite is good. Eating a regular diet without difficulty.   Bowel movements are regular.  The patient is voiding without difficulty.  Patient complains of pain in his right groin.  He is concerned about swelling.  He is taking ibuprofen 600 mg as needed for pain.  He has not taken anything in the past 2 days.  Patient works in [employer] and feels that he is unable to go back to work on 5/1/2024 due to lifting heavy boxes and working with equipment that is over 200 pounds.  Denies nausea vomiting      Mr. Miller has a reminder for a \"due or due soon\" health maintenance. I have asked that he contact his primary care provider for follow-up on this health maintenance.      Objective:     BP (!) 140/78 (Site: Left Upper Arm, Position: Sitting, Cuff Size: Large Adult)   Pulse 71   Temp 97.9 °F (36.6 °C) (Oral)   Resp 17   Ht 1.753 m (5' 9\")   Wt 93.4 kg (205 lb 12.8 oz)   SpO2 95%   BMI 30.39 kg/m²     General:  alert, cooperative   Abdomen: soft, non-tender   Incision:   healing well, no drainage, no erythema, seroma noted, no dehiscence, incision well approximated     Assessment:     Doing well postoperatively.  Seroma    Plan:     Advised to continue 600 mg of ibuprofen 3 times a day as needed for pain  May use heat or ice  Continue to walk as tolerated  Will plan to extend his return to work out for 4 weeks due to the amount of lifting that he does at his job.  Plan for return to work on May 30, 2024 with no restrictions.  Advised patient to continue wearing briefs and add compression shorts as tolerated to help with support and discomfort.  No lifting greater than 10 to 20 pounds.  Mery العلي, APRN - NP

## 2024-05-09 ENCOUNTER — OFFICE VISIT (OUTPATIENT)
Age: 64
End: 2024-05-09
Payer: COMMERCIAL

## 2024-05-09 VITALS
SYSTOLIC BLOOD PRESSURE: 135 MMHG | HEIGHT: 69 IN | TEMPERATURE: 98 F | BODY MASS INDEX: 30.57 KG/M2 | OXYGEN SATURATION: 98 % | HEART RATE: 79 BPM | DIASTOLIC BLOOD PRESSURE: 78 MMHG | RESPIRATION RATE: 16 BRPM | WEIGHT: 206.4 LBS

## 2024-05-09 DIAGNOSIS — F32.2 CURRENT SEVERE EPISODE OF MAJOR DEPRESSIVE DISORDER WITHOUT PSYCHOTIC FEATURES WITHOUT PRIOR EPISODE (HCC): Primary | ICD-10-CM

## 2024-05-09 PROCEDURE — 3078F DIAST BP <80 MM HG: CPT | Performed by: STUDENT IN AN ORGANIZED HEALTH CARE EDUCATION/TRAINING PROGRAM

## 2024-05-09 PROCEDURE — 99214 OFFICE O/P EST MOD 30 MIN: CPT | Performed by: STUDENT IN AN ORGANIZED HEALTH CARE EDUCATION/TRAINING PROGRAM

## 2024-05-09 PROCEDURE — 3075F SYST BP GE 130 - 139MM HG: CPT | Performed by: STUDENT IN AN ORGANIZED HEALTH CARE EDUCATION/TRAINING PROGRAM

## 2024-05-09 PROCEDURE — 1123F ACP DISCUSS/DSCN MKR DOCD: CPT | Performed by: STUDENT IN AN ORGANIZED HEALTH CARE EDUCATION/TRAINING PROGRAM

## 2024-05-09 RX ORDER — SERTRALINE HYDROCHLORIDE 25 MG/1
25 TABLET, FILM COATED ORAL
Qty: 90 TABLET | Refills: 0 | Status: SHIPPED | OUTPATIENT
Start: 2024-05-09

## 2024-05-09 ASSESSMENT — PATIENT HEALTH QUESTIONNAIRE - PHQ9
SUM OF ALL RESPONSES TO PHQ QUESTIONS 1-9: 19
3. TROUBLE FALLING OR STAYING ASLEEP: NEARLY EVERY DAY
9. THOUGHTS THAT YOU WOULD BE BETTER OFF DEAD, OR OF HURTING YOURSELF: SEVERAL DAYS
10. IF YOU CHECKED OFF ANY PROBLEMS, HOW DIFFICULT HAVE THESE PROBLEMS MADE IT FOR YOU TO DO YOUR WORK, TAKE CARE OF THINGS AT HOME, OR GET ALONG WITH OTHER PEOPLE: VERY DIFFICULT
2. FEELING DOWN, DEPRESSED OR HOPELESS: MORE THAN HALF THE DAYS
1. LITTLE INTEREST OR PLEASURE IN DOING THINGS: NEARLY EVERY DAY
SUM OF ALL RESPONSES TO PHQ QUESTIONS 1-9: 20
4. FEELING TIRED OR HAVING LITTLE ENERGY: NEARLY EVERY DAY
SUM OF ALL RESPONSES TO PHQ QUESTIONS 1-9: 20
6. FEELING BAD ABOUT YOURSELF - OR THAT YOU ARE A FAILURE OR HAVE LET YOURSELF OR YOUR FAMILY DOWN: MORE THAN HALF THE DAYS
SUM OF ALL RESPONSES TO PHQ9 QUESTIONS 1 & 2: 5
7. TROUBLE CONCENTRATING ON THINGS, SUCH AS READING THE NEWSPAPER OR WATCHING TELEVISION: NEARLY EVERY DAY
8. MOVING OR SPEAKING SO SLOWLY THAT OTHER PEOPLE COULD HAVE NOTICED. OR THE OPPOSITE, BEING SO FIGETY OR RESTLESS THAT YOU HAVE BEEN MOVING AROUND A LOT MORE THAN USUAL: NOT AT ALL
5. POOR APPETITE OR OVEREATING: NEARLY EVERY DAY
SUM OF ALL RESPONSES TO PHQ QUESTIONS 1-9: 20

## 2024-05-09 ASSESSMENT — COLUMBIA-SUICIDE SEVERITY RATING SCALE - C-SSRS
6. HAVE YOU EVER DONE ANYTHING, STARTED TO DO ANYTHING, OR PREPARED TO DO ANYTHING TO END YOUR LIFE?: NO
2. HAVE YOU ACTUALLY HAD ANY THOUGHTS OF KILLING YOURSELF?: NO
1. WITHIN THE PAST MONTH, HAVE YOU WISHED YOU WERE DEAD OR WISHED YOU COULD GO TO SLEEP AND NOT WAKE UP?: YES

## 2024-05-09 NOTE — PATIENT INSTRUCTIONS
Schedule an appointment with surgery regarding your ongoing hernia recovery.     Therapy options:

## 2024-05-09 NOTE — PROGRESS NOTES
Tristin Miller is a 63 y.o. year old male who presents today (05/09/24) for follow-up.     Assessment & Plan:   1. Current severe episode of major depressive disorder without psychotic features without prior episode (HCC)  -     sertraline (ZOLOFT) 25 MG tablet; Take 1 tablet by mouth nightly, Disp-90 tablet, R-0Normal  New issue, he was a little taken back by this diagnosis. I have him a list of therapist to reach out to - I think this will help him understand why this is happening.   We will start sertraline. We discussed Wellbutrin but doesn't want to try this because possible side effect of headache which is something he already suffers from.    Health Maintenance   Flu vaccine:   COVID vaccine:   Tetanus vaccine: 11/2022  Shingles vaccine: he has been trying to get it from the pharmacy but reports they are out  Pneumonia vaccine: recommended   Colon cancer screening: due, referred in the past, reminded to schedule  PSA: 11 11/2023 - needs urology eval   AAA screening: discussed, he would like to think about it   Lung cancer screening: does not qualify based on pack/years  Hep C: 2017  Lipid: 11/2023  DM: 5.5% 11/2022  Healthcare decision maker: wife   ACP:     RTC: 6-8wk mood follow-up    Subjective:   Tristin was seen today for Sleep Problem (Post sleep difficulty) and Depression    Had R inguinal hernia repair with Dr Ronald Regan 4/9/24. Complicated by seroma.   Was supposed to go back to work 5/1/24 but he tells me he hasn't returned yet so FLMA extended to the end of June.     He tells me he doesn't feel like himself. This has been getting worse the last couple of months (6 mo). Took time off school in March, had to drop classes 2 semesters . Eating too much, not sleeping, can't concentrate. He typically enjoyed photography but he is not enjoying it any more. Feeling very sluggish. Trouble walking to the bus stop to get his daughter. He is worried this summer he won't feel like doing things with his

## 2024-06-26 ENCOUNTER — OFFICE VISIT (OUTPATIENT)
Age: 64
End: 2024-06-26
Payer: COMMERCIAL

## 2024-06-26 VITALS
WEIGHT: 204.4 LBS | TEMPERATURE: 97.8 F | OXYGEN SATURATION: 95 % | HEART RATE: 70 BPM | HEIGHT: 65 IN | RESPIRATION RATE: 16 BRPM | DIASTOLIC BLOOD PRESSURE: 70 MMHG | BODY MASS INDEX: 34.05 KG/M2 | SYSTOLIC BLOOD PRESSURE: 113 MMHG

## 2024-06-26 DIAGNOSIS — G47.00 INSOMNIA, UNSPECIFIED TYPE: Primary | ICD-10-CM

## 2024-06-26 DIAGNOSIS — F32.2 CURRENT SEVERE EPISODE OF MAJOR DEPRESSIVE DISORDER WITHOUT PSYCHOTIC FEATURES WITHOUT PRIOR EPISODE (HCC): ICD-10-CM

## 2024-06-26 DIAGNOSIS — R97.20 ELEVATED PROSTATE SPECIFIC ANTIGEN (PSA): ICD-10-CM

## 2024-06-26 PROCEDURE — 3078F DIAST BP <80 MM HG: CPT | Performed by: STUDENT IN AN ORGANIZED HEALTH CARE EDUCATION/TRAINING PROGRAM

## 2024-06-26 PROCEDURE — 1123F ACP DISCUSS/DSCN MKR DOCD: CPT | Performed by: STUDENT IN AN ORGANIZED HEALTH CARE EDUCATION/TRAINING PROGRAM

## 2024-06-26 PROCEDURE — 3074F SYST BP LT 130 MM HG: CPT | Performed by: STUDENT IN AN ORGANIZED HEALTH CARE EDUCATION/TRAINING PROGRAM

## 2024-06-26 PROCEDURE — 99214 OFFICE O/P EST MOD 30 MIN: CPT | Performed by: STUDENT IN AN ORGANIZED HEALTH CARE EDUCATION/TRAINING PROGRAM

## 2024-06-26 RX ORDER — TRAZODONE HYDROCHLORIDE 50 MG/1
TABLET ORAL
Qty: 30 TABLET | Refills: 2 | Status: SHIPPED | OUTPATIENT
Start: 2024-06-26

## 2024-06-26 ASSESSMENT — PATIENT HEALTH QUESTIONNAIRE - PHQ9
SUM OF ALL RESPONSES TO PHQ QUESTIONS 1-9: 0
SUM OF ALL RESPONSES TO PHQ9 QUESTIONS 1 & 2: 0
6. FEELING BAD ABOUT YOURSELF - OR THAT YOU ARE A FAILURE OR HAVE LET YOURSELF OR YOUR FAMILY DOWN: NOT AT ALL
7. TROUBLE CONCENTRATING ON THINGS, SUCH AS READING THE NEWSPAPER OR WATCHING TELEVISION: NOT AT ALL
SUM OF ALL RESPONSES TO PHQ QUESTIONS 1-9: 0
10. IF YOU CHECKED OFF ANY PROBLEMS, HOW DIFFICULT HAVE THESE PROBLEMS MADE IT FOR YOU TO DO YOUR WORK, TAKE CARE OF THINGS AT HOME, OR GET ALONG WITH OTHER PEOPLE: NOT DIFFICULT AT ALL
SUM OF ALL RESPONSES TO PHQ QUESTIONS 1-9: 0
1. LITTLE INTEREST OR PLEASURE IN DOING THINGS: NOT AT ALL
8. MOVING OR SPEAKING SO SLOWLY THAT OTHER PEOPLE COULD HAVE NOTICED. OR THE OPPOSITE, BEING SO FIGETY OR RESTLESS THAT YOU HAVE BEEN MOVING AROUND A LOT MORE THAN USUAL: NOT AT ALL
SUM OF ALL RESPONSES TO PHQ QUESTIONS 1-9: 0
4. FEELING TIRED OR HAVING LITTLE ENERGY: NOT AT ALL
5. POOR APPETITE OR OVEREATING: NOT AT ALL
9. THOUGHTS THAT YOU WOULD BE BETTER OFF DEAD, OR OF HURTING YOURSELF: NOT AT ALL
2. FEELING DOWN, DEPRESSED OR HOPELESS: NOT AT ALL
3. TROUBLE FALLING OR STAYING ASLEEP: NOT AT ALL

## 2024-06-26 NOTE — ASSESSMENT & PLAN NOTE
He agrees to contact urology for appt. Would be open to MRI or biopsy, just doesn't want surgical procedure.

## 2024-06-26 NOTE — PATIENT INSTRUCTIONS
Please obtain the following vaccines from your local pharmacy. Please ask your pharmacy to fax our office a copy of the vaccination record. Our fax number is [phone].   - prevnar 20 - pneumonia vaccine     Can use tylenol 975mg or 1000mg every 8 hours as needed for pain, aches

## 2024-06-26 NOTE — PROGRESS NOTES
Tristin Miller is a 63 y.o. year old male who presents today (06/26/24) for follow-up.     Assessment & Plan:   1. Insomnia, unspecified type  Uncontrolled. Due to shift work. Only sleeping 5-6hr each day and feeling very tired, achy. Will start trazodone 25 or 50mg PRN when he has 8 hours to devote to sleep (usually on his days off when he is flipping to day schedule). He only gets about 7 hours to sleep between shifts so doesn't want to use on work days.   -     traZODone (DESYREL) 50 MG tablet; Take half tab or full tab 30 minutes to 1 hour before bedtime as needed for sleep. Max dose 50mg per day., Disp-30 tablet, R-2Normal  2. Current severe episode of major depressive disorder without psychotic features without prior episode (HCC)  Seeing some improvement with sertraline, will continue  3. Elevated prostate specific antigen (PSA)  Assessment & Plan:  He agrees to contact urology for appt. Would be open to MRI or biopsy, just doesn't want surgical procedure.       Health Maintenance   Flu vaccine:   COVID vaccine:   RSV:   Tetanus vaccine: 11/2022  Shingles vaccine: he has been trying to get it from the pharmacy but reports they are out  Pneumonia vaccine: recommended   Colon cancer screening: due, referred in the past, reminded to schedule  PSA: 11 11/2023 - needs urology eval   AAA screening: discussed, he would like to think about it   Lung cancer screening: does not qualify based on pack/years  Hep C: 2017  Lipid: 11/2023  DM: 5.5% 11/2022  Healthcare decision maker: wife   ACP:     RTC: CPE due 11/27/24    Subjective:   Tristin was seen today for 6 Month Follow-Up    MDD - new dx 5/9/24  - started sertraline 25mg 5/9/24 - he does think this has helped some. Feeling more relaxed, more interest in things   -Takes it at night, which is before he goes in to work. Only sleeping 4-5hr, works nights and has trouble with sleeping through day time noise and flipping sleep schedule with shift work.   - feeling

## 2024-07-18 DIAGNOSIS — G47.00 INSOMNIA, UNSPECIFIED TYPE: ICD-10-CM

## 2024-07-18 RX ORDER — TRAZODONE HYDROCHLORIDE 50 MG/1
TABLET ORAL
Qty: 90 TABLET | Refills: 0 | Status: SHIPPED | OUTPATIENT
Start: 2024-07-18

## 2024-07-18 NOTE — TELEPHONE ENCOUNTER
Rx sent to pharmacy as previously filled and verified by Verbal Order Read Back with provider.    NV 11/26/2024

## 2024-08-07 DIAGNOSIS — E78.2 MIXED HYPERLIPIDEMIA: ICD-10-CM

## 2024-08-07 RX ORDER — ROSUVASTATIN CALCIUM 5 MG/1
5 TABLET, COATED ORAL NIGHTLY
Qty: 90 TABLET | Refills: 1 | Status: SHIPPED | OUTPATIENT
Start: 2024-08-07

## 2024-08-07 NOTE — TELEPHONE ENCOUNTER
Pt last seen on 6/26/24. RTC: CPE due 11/27/24     Has appt on 11/26/24.    Rx last filled on 11/12/23 #90/2RF.    Rx sent to pharmacy as #90/1RF and verified by Verbal Order Read Back with provider.

## 2024-11-26 ENCOUNTER — OFFICE VISIT (OUTPATIENT)
Age: 64
End: 2024-11-26
Payer: COMMERCIAL

## 2024-11-26 VITALS
BODY MASS INDEX: 33.22 KG/M2 | DIASTOLIC BLOOD PRESSURE: 75 MMHG | HEART RATE: 69 BPM | TEMPERATURE: 97.8 F | RESPIRATION RATE: 16 BRPM | HEIGHT: 65 IN | WEIGHT: 199.4 LBS | OXYGEN SATURATION: 99 % | SYSTOLIC BLOOD PRESSURE: 125 MMHG

## 2024-11-26 DIAGNOSIS — Z23 FLU VACCINE NEED: ICD-10-CM

## 2024-11-26 DIAGNOSIS — E78.2 MIXED HYPERLIPIDEMIA: ICD-10-CM

## 2024-11-26 DIAGNOSIS — Z00.00 ROUTINE GENERAL MEDICAL EXAMINATION AT A HEALTH CARE FACILITY: Primary | ICD-10-CM

## 2024-11-26 DIAGNOSIS — Z00.00 ROUTINE GENERAL MEDICAL EXAMINATION AT A HEALTH CARE FACILITY: ICD-10-CM

## 2024-11-26 DIAGNOSIS — Z12.5 PROSTATE CANCER SCREENING: ICD-10-CM

## 2024-11-26 DIAGNOSIS — Z12.11 COLON CANCER SCREENING: ICD-10-CM

## 2024-11-26 DIAGNOSIS — R97.20 ELEVATED PROSTATE SPECIFIC ANTIGEN (PSA): ICD-10-CM

## 2024-11-26 DIAGNOSIS — I10 ESSENTIAL HYPERTENSION, BENIGN: ICD-10-CM

## 2024-11-26 PROBLEM — K40.90 RIGHT INGUINAL HERNIA: Status: RESOLVED | Noted: 2024-03-07 | Resolved: 2024-11-26

## 2024-11-26 LAB
ALBUMIN SERPL-MCNC: 3.7 G/DL (ref 3.5–5)
ALBUMIN/GLOB SERPL: 1 (ref 1.1–2.2)
ALP SERPL-CCNC: 89 U/L (ref 45–117)
ALT SERPL-CCNC: 14 U/L (ref 12–78)
ANION GAP SERPL CALC-SCNC: 7 MMOL/L (ref 2–12)
AST SERPL-CCNC: 20 U/L (ref 15–37)
BILIRUB SERPL-MCNC: 0.6 MG/DL (ref 0.2–1)
BUN SERPL-MCNC: 12 MG/DL (ref 6–20)
BUN/CREAT SERPL: 11 (ref 12–20)
CALCIUM SERPL-MCNC: 9.1 MG/DL (ref 8.5–10.1)
CHLORIDE SERPL-SCNC: 109 MMOL/L (ref 97–108)
CHOLEST SERPL-MCNC: 124 MG/DL
CO2 SERPL-SCNC: 26 MMOL/L (ref 21–32)
CREAT SERPL-MCNC: 1.09 MG/DL (ref 0.7–1.3)
ERYTHROCYTE [DISTWIDTH] IN BLOOD BY AUTOMATED COUNT: 20.7 % (ref 11.5–14.5)
GLOBULIN SER CALC-MCNC: 3.8 G/DL (ref 2–4)
GLUCOSE SERPL-MCNC: 88 MG/DL (ref 65–100)
HCT VFR BLD AUTO: 36.2 % (ref 36.6–50.3)
HDLC SERPL-MCNC: 55 MG/DL
HDLC SERPL: 2.3 (ref 0–5)
HGB BLD-MCNC: 10.4 G/DL (ref 12.1–17)
LDLC SERPL CALC-MCNC: 55.8 MG/DL (ref 0–100)
MCH RBC QN AUTO: 17.2 PG (ref 26–34)
MCHC RBC AUTO-ENTMCNC: 28.7 G/DL (ref 30–36.5)
MCV RBC AUTO: 59.7 FL (ref 80–99)
NRBC # BLD: 0 K/UL (ref 0–0.01)
NRBC BLD-RTO: 0 PER 100 WBC
PLATELET # BLD AUTO: 238 K/UL (ref 150–400)
POTASSIUM SERPL-SCNC: 4.2 MMOL/L (ref 3.5–5.1)
PROT SERPL-MCNC: 7.5 G/DL (ref 6.4–8.2)
PSA SERPL-MCNC: 12.2 NG/ML (ref 0.01–4)
RBC # BLD AUTO: 6.06 M/UL (ref 4.1–5.7)
SODIUM SERPL-SCNC: 142 MMOL/L (ref 136–145)
TRIGL SERPL-MCNC: 66 MG/DL
VLDLC SERPL CALC-MCNC: 13.2 MG/DL
WBC # BLD AUTO: 6 K/UL (ref 4.1–11.1)

## 2024-11-26 PROCEDURE — 99397 PER PM REEVAL EST PAT 65+ YR: CPT | Performed by: STUDENT IN AN ORGANIZED HEALTH CARE EDUCATION/TRAINING PROGRAM

## 2024-11-26 PROCEDURE — 90653 IIV ADJUVANT VACCINE IM: CPT | Performed by: STUDENT IN AN ORGANIZED HEALTH CARE EDUCATION/TRAINING PROGRAM

## 2024-11-26 PROCEDURE — 90471 IMMUNIZATION ADMIN: CPT | Performed by: STUDENT IN AN ORGANIZED HEALTH CARE EDUCATION/TRAINING PROGRAM

## 2024-11-26 PROCEDURE — 3074F SYST BP LT 130 MM HG: CPT | Performed by: STUDENT IN AN ORGANIZED HEALTH CARE EDUCATION/TRAINING PROGRAM

## 2024-11-26 PROCEDURE — 3078F DIAST BP <80 MM HG: CPT | Performed by: STUDENT IN AN ORGANIZED HEALTH CARE EDUCATION/TRAINING PROGRAM

## 2024-11-26 RX ORDER — ROSUVASTATIN CALCIUM 5 MG/1
5 TABLET, COATED ORAL NIGHTLY
Qty: 90 TABLET | Refills: 3 | Status: SHIPPED | OUTPATIENT
Start: 2024-11-26

## 2024-11-26 RX ORDER — AMLODIPINE BESYLATE 10 MG/1
10 TABLET ORAL DAILY
Qty: 90 TABLET | Refills: 3 | Status: SHIPPED | OUTPATIENT
Start: 2024-11-26

## 2024-11-26 SDOH — ECONOMIC STABILITY: FOOD INSECURITY: WITHIN THE PAST 12 MONTHS, YOU WORRIED THAT YOUR FOOD WOULD RUN OUT BEFORE YOU GOT MONEY TO BUY MORE.: NEVER TRUE

## 2024-11-26 SDOH — ECONOMIC STABILITY: FOOD INSECURITY: WITHIN THE PAST 12 MONTHS, THE FOOD YOU BOUGHT JUST DIDN'T LAST AND YOU DIDN'T HAVE MONEY TO GET MORE.: NEVER TRUE

## 2024-11-26 SDOH — ECONOMIC STABILITY: INCOME INSECURITY: HOW HARD IS IT FOR YOU TO PAY FOR THE VERY BASICS LIKE FOOD, HOUSING, MEDICAL CARE, AND HEATING?: NOT HARD AT ALL

## 2024-11-26 ASSESSMENT — PATIENT HEALTH QUESTIONNAIRE - PHQ9: DEPRESSION UNABLE TO ASSESS: PT REFUSES

## 2024-11-26 NOTE — PATIENT INSTRUCTIONS
Please call Dr Palomino, the surgeon, about the pain you feel from your hernia repair.     Please call [facility]    Please obtain the following vaccines from your local pharmacy. Please ask your pharmacy to fax our office a copy of the vaccination record. Our fax number is [phone].   - COVID booster  - Pneumonia vaccine (prevnar 20 or prevnar 21)

## 2024-11-26 NOTE — PROGRESS NOTES
vaccine:   Pneumonia vaccine: recommended   Colon cancer screening: due, +FH, referred   PSA: 11 11/2023 - needs urology eval   AAA screening: discussed, he would like to think about it   Lung cancer screening: does not qualify based on pack/years  Hep C: 2017  Lipid: 11/2023 rpt today  DM: 5.5% 11/2022  Healthcare decision maker: wife   ACP:        RTC: 6 mo follow-up    Subjective:   Tristin was seen today for Annual Exam    History of Present Illness  The patient is a 64-year-old male presenting for an annual physical exam.    He reports good health except for right leg swelling when wearing socks and walking, with a history of a soccer-related knee injury.    He mentions discomfort at the site of a previous hernia surgery, which he plans to discuss with Dr. العلي.    His home blood pressure readings range between 125 and 127.    He experiences sleep disturbances due to a malfunctioning heater. He occasionally takes trazodone for sleep but feels fatigued the next day. He has not been taking Zoloft due to similar side effects.    He reports a generally good mood despite recent stressors, including surgery and concerns about his daughter.    He has not yet seen a urologist.    He has not received his influenza vaccine this year.    His diet includes rice, meat, fish, vegetables, and occasional fast food. He drinks coffee, water, and occasionally Sprite or ginger ale. He has not consumed alcohol since his surgery in 04/2024.    He is physically active at work and has regular bowel movements and urination.    SOCIAL HISTORY  - Denies smoking or using any drugs    FAMILY HISTORY  His mother had colon cancer.    IMMUNIZATIONS  - COVID-19 booster received    Insomnia, shift work  - trazodone 25mg or 50mg QHS PRN but both doses makes too tried the next day    MDD - dx 5/9/24 - resolved 11/2024  - was prescribed sertraline 25mg but stopped after 2 months because he felt it was making him tired  - has not seen a

## 2024-12-03 NOTE — RESULT ENCOUNTER NOTE
Will send letter since he doesn't log on to [employer]  - PSA continues to slowly rise. Needs urology evaluation  - stable CBC and CMP, anemia stable due to thalassemia.   - cholesterol well controlled

## 2025-02-05 NOTE — PROGRESS NOTES
Gardenia Murdock is a 57y.o. year old male who presents today (05/01/23) for follow-up. Assessment & Plan:   1. Mixed hyperlipidemia  Assessment & Plan:  He is experiencing muscle ache and fatigue on rosuvastatin, but continued because of his elevated ASCVD risk. Will decrease to 5mg daily, hopefully this helps fatigue. Advised to add CoQ10 if muscle aches persist. Update lipid panel today to assess response. He wants to come back and have another lipid panel in 3 months on the reduced dose. Orders:  -     LIPID PANEL; Future  -     rosuvastatin (CRESTOR) 5 mg tablet; Take 1 Tablet by mouth nightly., Normal, Disp-90 Tablet, R-1  2. Elevated prostate specific antigen (PSA)  Assessment & Plan:  He has not seen urology because he didn't want a cystoscopy. We discussed today the higher the PSA value goes, the greater the concern for prostate cancer. He must see a urologist for this evaluation. I will repeat his PSA today to help triage urgency for urology evaluation. 3. Elevated PSA  -     PSA SCREENING (SCREENING); Future  4. Essential hypertension, benign  Assessment & Plan:  Controlled, cont amlodipine. Advised to bring BP cuff into next visit to check accuracy. RTC 3 mo HLD, repeat lipid    Subjective:   Jayda Kapoor was seen today for Hypertension, Hyperlipidemia, and Pre-diabetes    HTN  - amlodipine  - he doesn't check much at home but he does have a cuff. He feels BP is usually 130s (\"high\") at home. He wonders if his cuff is reliable. HLD  - started rosuvasatin 11/2022. He notes muscle aches but he has been working through it. He also feels very tired on the medication. Elevated PSA  - he has not seen the urologist  - he denies hematuria   - he does have nocturia when he drinks water at night. He notes polyuria when he drinks soda. L groin hernia  - referred to gen surgery. He plans to schedule when he can - currently very busy with college classes.  Planning to take summer class but just 1, hopefully will have more time for the appointment. It would be a good idea to schedule surgery between summer and fall classes. Dizziness at work   - this has gotten better since he started eating before his shift and bringing a snack. Review of Systems   All other systems reviewed and are negative. PMHx    Patient Active Problem List   Diagnosis Code    Essential hypertension, benign I10    Anemia, unspecified D64.9    Benign neoplasm of colon D12.6    Thalassemia trait D56.3    Impotence of organic origin N52.9    Positive PPD R76.11    Unspecified hemorrhoids without mention of complication J77.6    Elevated prostate specific antigen (PSA) R97.20    Mixed hyperlipidemia E78.2    Prediabetes R73.03    Vitamin D deficiency E55.9       Prior to Admission medications    Medication Sig Start Date End Date Taking? Authorizing Provider   rosuvastatin (CRESTOR) 5 mg tablet Take 1 Tablet by mouth nightly. 5/1/23  Yes Thor Moreira MD   amLODIPine (NORVASC) 10 mg tablet Take 1 Tablet by mouth daily. 12/6/22  Yes Thor Moreira MD   ACETAMINOPHEN (TYLENOL PO) Take  by mouth. As needed   Yes Provider, Historical   rosuvastatin (CRESTOR) 10 mg tablet Take 1 Tablet by mouth nightly. 11/4/22 5/1/23  Thor Moreira MD   cholecalciferol (VITAMIN D3) (2,000 UNITS /50 MCG) cap capsule Take 1 Capsule by mouth daily. 5/1/23  Provider, Historical       The following sections were reviewed & updated as appropriate: Problem List, Allergies, PMH, PSH, FH, and SH. Objective:   Visit Vitals  /73   Pulse 68   Temp 98.6 °F (37 °C) (Temporal)   Resp 20   Ht 5' 9\" (1.753 m)   Wt 206 lb 6.4 oz (93.6 kg)   SpO2 96%   BMI 30.48 kg/m²       Physical Exam  Constitutional:       General: He is not in acute distress. Eyes:      Extraocular Movements: Extraocular movements intact. Conjunctiva/sclera: Conjunctivae normal.   Cardiovascular:      Rate and Rhythm: Normal rate and regular rhythm. Heart sounds: No murmur heard. Pulmonary:      Effort: Pulmonary effort is normal. No respiratory distress. Abdominal:      General: Bowel sounds are normal.      Palpations: Abdomen is soft. Musculoskeletal:      Right lower leg: No edema. Left lower leg: No edema. Neurological:      General: No focal deficit present. Mental Status: He is alert.    Psychiatric:         Mood and Affect: Mood normal.         Behavior: Behavior normal.            Maureen Valdes MD PAST MEDICAL HISTORY:  Current smoker quit    H/O fracture of rib     Hydronephrosis, left     Kidney stone     Pulmonary nodule     Urothelial carcinoma of kidney

## 2025-05-27 ENCOUNTER — OFFICE VISIT (OUTPATIENT)
Age: 65
End: 2025-05-27
Payer: COMMERCIAL

## 2025-05-27 VITALS
HEIGHT: 68 IN | DIASTOLIC BLOOD PRESSURE: 75 MMHG | HEART RATE: 73 BPM | WEIGHT: 202.4 LBS | SYSTOLIC BLOOD PRESSURE: 122 MMHG | RESPIRATION RATE: 18 BRPM | BODY MASS INDEX: 30.68 KG/M2 | OXYGEN SATURATION: 95 % | TEMPERATURE: 97 F

## 2025-05-27 DIAGNOSIS — R52 ACHES: ICD-10-CM

## 2025-05-27 DIAGNOSIS — R97.20 ELEVATED PROSTATE SPECIFIC ANTIGEN (PSA): ICD-10-CM

## 2025-05-27 DIAGNOSIS — E78.2 MIXED HYPERLIPIDEMIA: ICD-10-CM

## 2025-05-27 DIAGNOSIS — I10 ESSENTIAL HYPERTENSION, BENIGN: Primary | ICD-10-CM

## 2025-05-27 PROCEDURE — 3074F SYST BP LT 130 MM HG: CPT | Performed by: STUDENT IN AN ORGANIZED HEALTH CARE EDUCATION/TRAINING PROGRAM

## 2025-05-27 PROCEDURE — 99214 OFFICE O/P EST MOD 30 MIN: CPT | Performed by: STUDENT IN AN ORGANIZED HEALTH CARE EDUCATION/TRAINING PROGRAM

## 2025-05-27 PROCEDURE — 3078F DIAST BP <80 MM HG: CPT | Performed by: STUDENT IN AN ORGANIZED HEALTH CARE EDUCATION/TRAINING PROGRAM

## 2025-05-27 PROCEDURE — 1123F ACP DISCUSS/DSCN MKR DOCD: CPT | Performed by: STUDENT IN AN ORGANIZED HEALTH CARE EDUCATION/TRAINING PROGRAM

## 2025-05-27 RX ORDER — IBUPROFEN 600 MG/1
600 TABLET, FILM COATED ORAL DAILY PRN
Qty: 30 TABLET | Refills: 0 | Status: SHIPPED | OUTPATIENT
Start: 2025-05-27

## 2025-05-27 SDOH — ECONOMIC STABILITY: FOOD INSECURITY: WITHIN THE PAST 12 MONTHS, YOU WORRIED THAT YOUR FOOD WOULD RUN OUT BEFORE YOU GOT MONEY TO BUY MORE.: NEVER TRUE

## 2025-05-27 SDOH — ECONOMIC STABILITY: FOOD INSECURITY: WITHIN THE PAST 12 MONTHS, THE FOOD YOU BOUGHT JUST DIDN'T LAST AND YOU DIDN'T HAVE MONEY TO GET MORE.: NEVER TRUE

## 2025-05-27 ASSESSMENT — PATIENT HEALTH QUESTIONNAIRE - PHQ9
SUM OF ALL RESPONSES TO PHQ QUESTIONS 1-9: 0
9. THOUGHTS THAT YOU WOULD BE BETTER OFF DEAD, OR OF HURTING YOURSELF: NOT AT ALL
7. TROUBLE CONCENTRATING ON THINGS, SUCH AS READING THE NEWSPAPER OR WATCHING TELEVISION: NOT AT ALL
2. FEELING DOWN, DEPRESSED OR HOPELESS: NOT AT ALL
SUM OF ALL RESPONSES TO PHQ QUESTIONS 1-9: 0
5. POOR APPETITE OR OVEREATING: NOT AT ALL
10. IF YOU CHECKED OFF ANY PROBLEMS, HOW DIFFICULT HAVE THESE PROBLEMS MADE IT FOR YOU TO DO YOUR WORK, TAKE CARE OF THINGS AT HOME, OR GET ALONG WITH OTHER PEOPLE: NOT DIFFICULT AT ALL
SUM OF ALL RESPONSES TO PHQ QUESTIONS 1-9: 0
6. FEELING BAD ABOUT YOURSELF - OR THAT YOU ARE A FAILURE OR HAVE LET YOURSELF OR YOUR FAMILY DOWN: NOT AT ALL
4. FEELING TIRED OR HAVING LITTLE ENERGY: NOT AT ALL
8. MOVING OR SPEAKING SO SLOWLY THAT OTHER PEOPLE COULD HAVE NOTICED. OR THE OPPOSITE, BEING SO FIGETY OR RESTLESS THAT YOU HAVE BEEN MOVING AROUND A LOT MORE THAN USUAL: NOT AT ALL
SUM OF ALL RESPONSES TO PHQ QUESTIONS 1-9: 0
1. LITTLE INTEREST OR PLEASURE IN DOING THINGS: NOT AT ALL
3. TROUBLE FALLING OR STAYING ASLEEP: NOT AT ALL

## 2025-05-27 NOTE — PROGRESS NOTES
Tristin Miller is a 64 y.o. year old male who presents today (05/27/25) for follow-up.     Assessment & Plan:   1. Essential hypertension, benign  Assessment & Plan:  Controlled, cont amlodipine.   2. Aches  Shoulders, back due to physical activity at work. Take tylenol daily but sometimes this is not effective. About once a week looking for something strong. Will reorder ibuprofen 600mg daily which he took in the past.  -     ibuprofen (ADVIL;MOTRIN) 600 MG tablet; Take 1 tablet by mouth daily as needed for Pain, Disp-30 tablet, R-0Normal  3. Elevated prostate specific antigen (PSA)  Assessment & Plan:  He agrees to contact urology for appt. I am concerned he may have a low grade prostate cancer. PSA has been >10  4. HLD   Controlled, cont rosuvastatin        Health Maintenance   Flu vaccine: 11/2024  COVID vaccine:  recommended   RSV:   Tetanus vaccine: 11/2022  Shingles vaccine:   Pneumonia vaccine: recommended   Colon cancer screening: due, +FH, referred   PSA: 11 11/2023 - needs urology eval   AAA screening: discussed, he would like to think about it   Lung cancer screening: does not qualify based on pack/years  Hep C: 2017  Lipid: 11/2024 rpt today  DM: 5.5% 11/2022  Healthcare decision maker: wife   ACP:     RTC: 6 mo CPE    Subjective:   Tristin was seen today for Hypertension    History of Present Illness  The patient presents for evaluation of hypertension, prostate cancer, and back and shoulder pain.    He reports a general sense of well-being, with no significant mood disturbances.   He has been monitoring his blood pressure at home daily, which typically ranges between 128 and 132 systolic. Takes amlodipine in the evening.  He notes that his blood pressure does not significantly increase during episodes of anger but tends to rise when he is worried.     He has been experiencing work-related injuries, including a recent incident involving his right hand. He also reports back and shoulder pain, which

## 2025-05-27 NOTE — ASSESSMENT & PLAN NOTE
He agrees to contact urology for appt. I am concerned he may have a low grade prostate cancer. PSA has been >10

## 2025-06-23 DIAGNOSIS — R52 ACHES: ICD-10-CM

## 2025-06-23 RX ORDER — IBUPROFEN 600 MG/1
600 TABLET, FILM COATED ORAL DAILY PRN
Qty: 30 TABLET | Refills: 0 | Status: SHIPPED | OUTPATIENT
Start: 2025-06-23

## 2025-06-23 NOTE — TELEPHONE ENCOUNTER
Pt last seen on 5/27/2025. Due to return in months.    Has appt on 12/1/2025.    Rx last filled on 5/27/2025 #30/0RF.    Rx sent to pharmacy as previously filled and verified by Verbal Order Read Back with provider.

## 2025-07-19 DIAGNOSIS — R52 ACHES: ICD-10-CM

## 2025-07-21 RX ORDER — IBUPROFEN 600 MG/1
600 TABLET, FILM COATED ORAL DAILY PRN
Qty: 30 TABLET | Refills: 0 | Status: SHIPPED | OUTPATIENT
Start: 2025-07-21

## 2025-07-21 NOTE — TELEPHONE ENCOUNTER
Pt last seen on 5/27/2025. Due to return in 6 months.    Has appt on 12/1/2025.    Rx last filled on 6/23/25 #30/0RF.    Will forward to provider for refill.

## 2025-08-18 DIAGNOSIS — R52 ACHES: ICD-10-CM

## 2025-08-18 RX ORDER — IBUPROFEN 600 MG/1
600 TABLET, FILM COATED ORAL DAILY PRN
Qty: 30 TABLET | Refills: 0 | OUTPATIENT
Start: 2025-08-18

## (undated) DEVICE — AGENT HEMSTAT 3GM PURIFIED PLNT STARCH PWD ABSRB ARISTA AH

## (undated) DEVICE — Z DISCONTINUED USE 2220190 SUTURE VICRYL SZ 3-0 L27IN ABSRB UD L26MM SH 1/2 CIR J416H

## (undated) DEVICE — APPLICATOR SURG L38CM RIG ATOMIZING SGL USE XL-R FLEXITIP

## (undated) DEVICE — HYPODERMIC SAFETY NEEDLE: Brand: MAGELLAN

## (undated) DEVICE — SUTURE MONOCRYL SZ 4-0 L27IN ABSRB UD L19MM PS-2 1/2 CIR PRIM Y426H

## (undated) DEVICE — GARMENT,MEDLINE,DVT,INT,CALF,MED, GEN2: Brand: MEDLINE

## (undated) DEVICE — LIQUIBAND RAPID ADHESIVE 36/CS 0.8ML: Brand: MEDLINE

## (undated) DEVICE — INTENT OT USE PROVIDES A STERILE INTERFACE BETWEEN THE OPERATING ROOM SURGICAL LAMPS (NON-STERILE) AND THE SURGEON OR STAFF WORKING IN THE STERILE FIELD.: Brand: ASPEN® ALC PLUS LIGHT HANDLE COVER

## (undated) DEVICE — PAD PT POS 36 IN SURGYPAD DISP

## (undated) DEVICE — GENERAL LAPAROSCOPY - SMH: Brand: MEDLINE INDUSTRIES, INC.

## (undated) DEVICE — SOLUTION IRRIG 1000ML 0.9% SOD CHL USP POUR PLAS BTL

## (undated) DEVICE — X-RAY DETECTABLE SPONGES,16 PLY: Brand: VISTEC

## (undated) DEVICE — GOWN,SIRUS,NONRNF,SETINSLV,XL,20/CS: Brand: MEDLINE

## (undated) DEVICE — Device

## (undated) DEVICE — ARM DRAPE

## (undated) DEVICE — SUTURE ETHIBOND EXCEL SZ 2-0 L36IN NONABSORBABLE GRN L26MM SH X523H

## (undated) DEVICE — SEAL

## (undated) DEVICE — SOLUTION ANTIFOG VIS SYS CLEARIFY LAPSCP

## (undated) DEVICE — SUTURE DEV SZ 3-0 V-LOC 90 L12IN TO L18IN CV-23 VLT VLOCM0844

## (undated) DEVICE — BLADELESS OBTURATOR: Brand: WECK VISTA